# Patient Record
Sex: FEMALE | Race: WHITE | Employment: UNEMPLOYED | URBAN - METROPOLITAN AREA
[De-identification: names, ages, dates, MRNs, and addresses within clinical notes are randomized per-mention and may not be internally consistent; named-entity substitution may affect disease eponyms.]

---

## 2024-09-06 ENCOUNTER — APPOINTMENT (OUTPATIENT)
Dept: ULTRASOUND IMAGING | Facility: HOSPITAL | Age: 24
End: 2024-09-06
Attending: EMERGENCY MEDICINE
Payer: COMMERCIAL

## 2024-09-06 ENCOUNTER — HOSPITAL ENCOUNTER (INPATIENT)
Facility: HOSPITAL | Age: 24
LOS: 2 days | Discharge: HOME | End: 2024-09-08
Attending: EMERGENCY MEDICINE | Admitting: HOSPITALIST
Payer: COMMERCIAL

## 2024-09-06 ENCOUNTER — HOSPITAL ENCOUNTER (OUTPATIENT)
Age: 24
Discharge: ANOTHER HOSPITAL | End: 2024-09-06
Payer: COMMERCIAL

## 2024-09-06 VITALS
HEART RATE: 100 BPM | RESPIRATION RATE: 16 BRPM | SYSTOLIC BLOOD PRESSURE: 122 MMHG | DIASTOLIC BLOOD PRESSURE: 58 MMHG | TEMPERATURE: 101 F | OXYGEN SATURATION: 98 %

## 2024-09-06 DIAGNOSIS — R10.9 ABDOMINAL PAIN, ACUTE: Primary | ICD-10-CM

## 2024-09-06 DIAGNOSIS — R10.9 LEFT FLANK PAIN: ICD-10-CM

## 2024-09-06 DIAGNOSIS — Z34.92 SECOND TRIMESTER PREGNANCY (HCC): ICD-10-CM

## 2024-09-06 DIAGNOSIS — R50.9 FEVER, UNSPECIFIED FEVER CAUSE: ICD-10-CM

## 2024-09-06 DIAGNOSIS — O21.9 NAUSEA AND VOMITING IN PREGNANCY (HCC): ICD-10-CM

## 2024-09-06 DIAGNOSIS — N12 PYELONEPHRITIS: Primary | ICD-10-CM

## 2024-09-06 LAB
ALBUMIN SERPL-MCNC: 4.1 G/DL (ref 3.2–4.8)
ALBUMIN/GLOB SERPL: 1.5 {RATIO} (ref 1–2)
ALP LIVER SERPL-CCNC: 72 U/L
ALT SERPL-CCNC: 13 U/L
ANION GAP SERPL CALC-SCNC: 9 MMOL/L (ref 0–18)
ANTIBODY SCREEN: NEGATIVE
AST SERPL-CCNC: 16 U/L (ref ?–34)
BASOPHILS # BLD AUTO: 0.04 X10(3) UL (ref 0–0.2)
BASOPHILS NFR BLD AUTO: 0.3 %
BILIRUB SERPL-MCNC: 0.3 MG/DL (ref 0.3–1.2)
BILIRUB UR QL: NEGATIVE
BUN BLD-MCNC: <5 MG/DL (ref 9–23)
CALCIUM BLD-MCNC: 8.7 MG/DL (ref 8.7–10.4)
CHLORIDE SERPL-SCNC: 105 MMOL/L (ref 98–112)
CLARITY UR: CLEAR
CO2 SERPL-SCNC: 22 MMOL/L (ref 21–32)
COLOR UR: YELLOW
CREAT BLD-MCNC: 0.56 MG/DL
DEPRECATED RDW RBC AUTO: 41.9 FL (ref 35.1–46.3)
EGFRCR SERPLBLD CKD-EPI 2021: 131 ML/MIN/1.73M2 (ref 60–?)
EOSINOPHIL # BLD AUTO: 0.03 X10(3) UL (ref 0–0.7)
EOSINOPHIL NFR BLD AUTO: 0.2 %
ERYTHROCYTE [DISTWIDTH] IN BLOOD BY AUTOMATED COUNT: 13.2 % (ref 11–15)
GLOBULIN PLAS-MCNC: 2.8 G/DL (ref 2–3.5)
GLUCOSE BLD-MCNC: 115 MG/DL (ref 70–99)
GLUCOSE UR-MCNC: NORMAL MG/DL
HCT VFR BLD AUTO: 35 %
HGB BLD-MCNC: 12 G/DL
HGB UR QL STRIP.AUTO: NEGATIVE
IMM GRANULOCYTES # BLD AUTO: 0.05 X10(3) UL (ref 0–1)
IMM GRANULOCYTES NFR BLD: 0.4 %
KETONES UR-MCNC: 40 MG/DL
LACTATE SERPL-SCNC: 1.6 MMOL/L (ref 0.5–2)
LEUKOCYTE ESTERASE UR QL STRIP.AUTO: 75
LIPASE SERPL-CCNC: 34 U/L (ref 12–53)
LYMPHOCYTES # BLD AUTO: 1.49 X10(3) UL (ref 1–4)
LYMPHOCYTES NFR BLD AUTO: 11.6 %
MCH RBC QN AUTO: 29.8 PG (ref 26–34)
MCHC RBC AUTO-ENTMCNC: 34.3 G/DL (ref 31–37)
MCV RBC AUTO: 86.8 FL
MONOCYTES # BLD AUTO: 0.64 X10(3) UL (ref 0.1–1)
MONOCYTES NFR BLD AUTO: 5 %
NEUTROPHILS # BLD AUTO: 10.64 X10 (3) UL (ref 1.5–7.7)
NEUTROPHILS # BLD AUTO: 10.64 X10(3) UL (ref 1.5–7.7)
NEUTROPHILS NFR BLD AUTO: 82.5 %
NITRITE UR QL STRIP.AUTO: NEGATIVE
PH UR: 6.5 [PH] (ref 5–8)
PLATELET # BLD AUTO: 240 10(3)UL (ref 150–450)
POTASSIUM SERPL-SCNC: 3.7 MMOL/L (ref 3.5–5.1)
PROT SERPL-MCNC: 6.9 G/DL (ref 5.7–8.2)
PROT UR-MCNC: 20 MG/DL
RBC # BLD AUTO: 4.03 X10(6)UL
RH BLOOD TYPE: POSITIVE
RH BLOOD TYPE: POSITIVE
SARS-COV-2 RNA RESP QL NAA+PROBE: NOT DETECTED
SODIUM SERPL-SCNC: 136 MMOL/L (ref 136–145)
SP GR UR STRIP: 1.02 (ref 1–1.03)
UROBILINOGEN UR STRIP-ACNC: NORMAL
WBC # BLD AUTO: 12.9 X10(3) UL (ref 4–11)

## 2024-09-06 PROCEDURE — 76815 OB US LIMITED FETUS(S): CPT | Performed by: EMERGENCY MEDICINE

## 2024-09-06 PROCEDURE — 99215 OFFICE O/P EST HI 40 MIN: CPT | Performed by: EMERGENCY MEDICINE

## 2024-09-06 PROCEDURE — 76770 US EXAM ABDO BACK WALL COMP: CPT | Performed by: EMERGENCY MEDICINE

## 2024-09-06 PROCEDURE — 99222 1ST HOSP IP/OBS MODERATE 55: CPT | Performed by: HOSPITALIST

## 2024-09-06 RX ORDER — SODIUM CHLORIDE 9 MG/ML
INJECTION, SOLUTION INTRAVENOUS CONTINUOUS
Status: DISCONTINUED | OUTPATIENT
Start: 2024-09-06 | End: 2024-09-08

## 2024-09-06 RX ORDER — ONDANSETRON 2 MG/ML
4 INJECTION INTRAMUSCULAR; INTRAVENOUS ONCE
Status: COMPLETED | OUTPATIENT
Start: 2024-09-06 | End: 2024-09-06

## 2024-09-06 RX ORDER — ACETAMINOPHEN 500 MG
1000 TABLET ORAL ONCE
Status: COMPLETED | OUTPATIENT
Start: 2024-09-06 | End: 2024-09-06

## 2024-09-06 RX ORDER — ONDANSETRON 2 MG/ML
4 INJECTION INTRAMUSCULAR; INTRAVENOUS EVERY 6 HOURS PRN
Status: DISCONTINUED | OUTPATIENT
Start: 2024-09-06 | End: 2024-09-08

## 2024-09-06 RX ORDER — ACETAMINOPHEN 500 MG
500 TABLET ORAL EVERY 4 HOURS PRN
Status: DISCONTINUED | OUTPATIENT
Start: 2024-09-06 | End: 2024-09-07

## 2024-09-06 NOTE — ED PROVIDER NOTES
Patient Seen in: [facility] Emergency Department    History   No chief complaint on file.    Stated Complaint: Abdominal Pain    HPI    This is an otherwise healthy 24-year-old -0-0-1 approximately 16 weeks gestation, has not established with an OB in the [place] yet but had an ultrasound done in [place] at the end of 2024, presents to the ER for evaluation of pelvic pain, left flank pain, headache, subjective fever, chills, feeling unwell.  Bermudian interpretation utilized throughout encounter and patient's  also contributes to history.  She states over the past couple of days she has developed multiple symptoms including dysuria and burning when she urinates, slight suprapubic pressure, and now today is having left flank pain.  She reports chills last night though did not take a temperature at home she thinks she could have had a fever.  She reports nausea and poor p.o. intake without vomiting, diarrhea or bloody stool.  Denies hematuria.  No vaginal bleeding or discharge.  She also reports a generalized headache since yesterday in the front.  Denies numbness weakness or tingling in her extremities or vision changes.  She feels aching all over her body.  She does report mild sore throat but no cough or congestion.  No chest pain or difficulty breathing.  She was seen at urgent care yesterday and started on cephalexin but her symptoms haven't improved. She was sent to the ER today from  due to her symptoms for further evaluation.     History reviewed. No pertinent past medical history.    History reviewed. No pertinent surgical history.         History reviewed. No pertinent family history.    Social History     Socioeconomic History    Marital status:    Tobacco Use    Smoking status: Never    Smokeless tobacco: Never   Vaping Use    Vaping status: Never Used   Substance and Sexual Activity    Alcohol use: Not Currently    Drug use: Never     Social Determinants of Health      Food Insecurity: No Food Insecurity (9/6/2024)    Food Insecurity     Food Insecurity: Never true   Transportation Needs: No Transportation Needs (9/6/2024)    Transportation Needs     Lack of Transportation: No   Housing Stability: Low Risk  (9/6/2024)    Housing Stability     Housing Instability: No       Review of Systems    Positive for stated complaint: Abdominal Pain  Other systems are as noted in HPI.  Constitutional and vital signs reviewed.      All other systems reviewed and negative except as noted above.    PSFH elements reviewed from today and agreed except as otherwise stated in HPI.    Physical Exam     ED Triage Vitals   BP 09/06/24 1659 156/87   Pulse 09/06/24 1659 108   Resp 09/06/24 1659 18   Temp 09/06/24 1659 99.4 °F (37.4 °C)   Temp src 09/06/24 1659 Temporal   SpO2 09/06/24 1659 100 %   O2 Device 09/06/24 2244 None (Room air)       Current:/58 (BP Location: Right arm)   Pulse 83   Temp 98.3 °F (36.8 °C) (Oral)   Resp 18   Ht 170.2 cm (5' 7\")   Wt 71.7 kg   SpO2 97%   BMI 24.75 kg/m²   GENERAL: alert, no distress  SKIN: good skin turgor, no rashes  HEENT: atraumatic, normocephalic, no pharyngeal swelling or tonsillar enlargement   EYES: sclera non icteric, conjunctiva non-injected, PERRLA and EOMI bilaterally   NECK: supple, no meningismus, FROM   LUNGS:no resp distress, lungs CTAB no wheezes or crackles.   CARDIO: tachycardic with regular rhythm, no murmur, 2+ radial and dp pulses bilaterally   EXTREMITIES: no cyanosis, clubbing or edema  BACK: no midline c/t/l spine ttp. No stepoff or deformity. +L CVA tenderness, no R CVAT.   GI: soft, mild suprapubic tenderness, no guarding or rebound. Abdomen gravid below umbilicus.   NEURO: Strength is 5/5 bilateral upper extremities on handgrip, elbow flexion and extension, hip flexion, knee flexion and extension and ankle plantarflexion and dorsiflexion, sensation tact light touch and symmetric bilateral upper and lower  extremities      ED Course     Labs Reviewed   URINALYSIS WITH CULTURE REFLEX - Abnormal; Notable for the following components:       Result Value    Ketones Urine 40 (*)     Protein Urine 20 (*)     Leukocyte Esterase Urine 75 (*)     WBC Urine 6-10 (*)     Bacteria Urine Rare (*)     Squamous Epi. Cells Few (*)     All other components within normal limits   CBC WITH DIFFERENTIAL WITH PLATELET - Abnormal; Notable for the following components:    WBC 12.9 (*)     Neutrophil Absolute Prelim 10.64 (*)     Neutrophil Absolute 10.64 (*)     All other components within normal limits   COMP METABOLIC PANEL (14) - Abnormal; Notable for the following components:    Glucose 115 (*)     BUN <5 (*)     All other components within normal limits   LIPASE - Normal   LACTIC ACID, PLASMA - Normal   RAPID SARS-COV-2 BY PCR - Normal   BASIC METABOLIC PANEL (8)   CBC WITH DIFFERENTIAL WITH PLATELET   TYPE AND SCREEN    Narrative:     The following orders were created for panel order Type and screen.  Procedure                               Abnormality         Status                     ---------                               -----------         ------                     ABORH (Blood Type)[[id number]]                               Final result               Antibody Screen[[id number]]                                  Final result                 Please view results for these tests on the individual orders.   ABORH (BLOOD TYPE)   ANTIBODY SCREEN   ABORH CONFIRMATION   BLOOD CULTURE   BLOOD CULTURE   URINE CULTURE, ROUTINE       MDM      24F  at approx 16 wga presents to the ED with pelvic pain, dysuria, subjective fever, chills, left flank pain, headache, has already been on cephalexin for a couple of days. Pt mildly tachycardic though afebrile on arrival to the ED. Initial BP from triage is hypertensive. On exam has mild L CVAT and LLQ ttp. Abdomen gravid but otherwise no other tenderness throughout.     Ddx    Pyelonephritis  Uti  Clinically low suspicion for diverticulitis  Low suspicion TOA   Low suspicion pregnancy loss   Sepsis   I have low suspicion her headache is secondary to venous sinus thrombosis, clinically   Given she's <20 wga preeclampsia is unlikely, though pt did have elevated BP in triage     Plan: cbc, cmp, bcx, lactic, ua/ucx, kidney US, pelvic US    Given pt pregnant with concern for pyelonephritis anticipate likely admission for observation and IV antibiotics   Admission disposition: 9/6/2024  7:17 PM         ED Course as of 09/07/24 0018  ------------------------------------------------------------  Time: 09/06 1739  Comment:  bpm   ------------------------------------------------------------  Time: 09/06 1746  Value: Urinalysis with Culture Reflex(!)  Comment: UA with ketonuria, c/w UTI. Rocephin ordered   ------------------------------------------------------------  Time: 09/06 1746  Value: WBC(!): 12.9  Comment: Leukocytosis with left shift noted.   ------------------------------------------------------------  Time: 09/06 1749  Value: Rapid SARS-CoV-2 by PCR: Not Detected  Comment: (Reviewed)  ------------------------------------------------------------  Time: 09/06 1749  Value: Glucose Urine: Normal  Comment: (Reviewed)  ------------------------------------------------------------  Time: 09/06 1749  Value: Protein Urine(!): 20  Comment: (Reviewed)  ------------------------------------------------------------  Time: 09/06 1814  Value: Lipase: 34  Comment: Lipase normal. Pt lactic acid is normal.   ------------------------------------------------------------  Time: 09/06 1814  Value: CREATININE: 0.56  Comment: Cr normal   ------------------------------------------------------------  Time: 09/06 2110  Value: US PREGNANCY LTD (CPT=76815)  Comment:   Impression  CONCLUSION:  1. Single live intrauterine gestation in cephalic presentation.  Average ultrasound age based on today's exam 19 weeks  4 days +/-1 week and 3 days corresponding to LILLY of January 27, 2025.  2. Incidental 3 mm left choroid plexus cyst.  Follow-up nonemergent complete fetal anatomical survey recommended.      ------------------------------------------------------------  Time: 09/06 2110  Value: US KIDNEY/BLADDER (CPT=76770)  Comment: FINDINGS:   RIGHT KIDNEY: Normal.  Right kidney length is 12.2 cm.  Normal echotexture.  No hydronephrosis, mass, calculus or perirenal fluid.    LEFT KIDNEY: Normal.  Left kidney length is 10.8 cm.  Normal echotexture.  No hydronephrosis, mass, calculus or perirenal fluid.    BLADDER: The bladder is collapsed measuring 12.4 mL and is not well assessed.  Grossly no stones or focal urothelial thickening.  The ureteral jets are patent bilaterally.  The gravid uterus is partially imaged.      CONCLUSION:   1. Unremarkable sonographic appearance of the kidneys.   2. Bladder is only mildly distended but without gross abnormality.     ------------------------------------------------------------  Time: 09/06 2134  Comment: Pt reevaluated sts feeling much better. VSS. Updated with labs, results of US and plan.          Disposition and Plan     Clinical Impression:  1. Pyelonephritis    2. Second trimester pregnancy (HCC)    3. Left flank pain         Disposition:  Admit  9/6/2024  7:17 pm       Maria Ines Patel DO  Attending Physician  Emergency Medicine

## 2024-09-06 NOTE — ED INITIAL ASSESSMENT (HPI)
Patient complains of fever, abd pain, and nausea with head pain since yesterday, states she is 16 weeks pregnant

## 2024-09-06 NOTE — DISCHARGE INSTRUCTIONS
Acude al servicio de urgencias debido a dolor y sensibilidad en la parte inferior del abdomen, así trish dolor y sensibilidad en el cuadrante superior jovany.  También presenta fiebre.  Nada de comer ni beber de marium a la kerrie de emergencias.    You are going to the emergency department due to lower abdominal pain and tenderness as well as left upper quadrant abdominal pain and tenderness.  You are also exhibiting a fever.  Nothing to eat or drink on your way to the emergency room.

## 2024-09-06 NOTE — ED INITIAL ASSESSMENT (HPI)
Pt is 16 wks pregnant with bilateral lower abdominal pain wrapping to flank with HA since yesterday; seen at [facility] yesterday and given Keflex; last US 7/31/24; denies vaginal bleeding or discharge; pt is Faroese speaking

## 2024-09-06 NOTE — ED PROVIDER NOTES
Patient Seen in: [facility]      History     Chief Complaint   Patient presents with    Abdominal Pain     Stated Complaint: 4 MONTHS PREG. /  STOMACH PAIN    Subjective:   HPI  Lena Montenegro is a 24 year old 16-week pregnant female who is Wolof-speaking accompanied by  who prefers to translate for patient here for lower abdominal cramping, and pain. Unk gestation, and DD.  No Vaginal complaints.   Pain radiates to the back, and left upper quadrant.  Nausea and vomiting 4 times today. Has had prenatal care in [place] including . No established care In the states.  Was seen at an outside facility clinic yesterday and was diagnosed with urinary tract infection.  She was placed on Keflex.  Symptoms are now worse.  Reports fetal movement.     Objective:   History reviewed. No pertinent past medical history.           History reviewed. No pertinent surgical history.             Social History     Socioeconomic History    Marital status:    Tobacco Use    Smoking status: Never    Smokeless tobacco: Never              Review of Systems    Positive for stated Chief Complaint: Abdominal Pain    Other systems are as noted in HPI.  Constitutional and vital signs reviewed.      All other systems reviewed and negative except as noted above.    Physical Exam     ED Triage Vitals   BP 09/06/24 1628 122/58   Pulse 09/06/24 1617 100   Resp 09/06/24 1617 16   Temp 09/06/24 1617 (!) 100.5 °F (38.1 °C)   Temp src 09/06/24 1617 Oral   SpO2 09/06/24 1617 98 %   O2 Device 09/06/24 1617 None (Room air)       Current Vitals:   Vital Signs  BP: 122/58  Pulse: 100  Resp: 16  Temp: (!) 100.5 °F (38.1 °C)  Temp src: Oral  Fetal Heart Tones: 160    Oxygen Therapy  SpO2: 98 %  O2 Device: None (Room air)            Physical Exam  Vitals and nursing note reviewed.   Constitutional:       Appearance: Normal appearance. She is well-developed.   HENT:      Head: Normocephalic.      Nose: Nose normal.   Eyes:       Pupils: Pupils are equal, round, and reactive to light.   Cardiovascular:      Rate and Rhythm: Normal rate.      Pulses: Normal pulses.   Pulmonary:      Effort: Pulmonary effort is normal.   Abdominal:      Tenderness: There is abdominal tenderness in the right upper quadrant, right lower quadrant, suprapubic area and left upper quadrant.   Musculoskeletal:         General: Normal range of motion.   Skin:     Capillary Refill: Capillary refill takes less than 2 seconds.   Neurological:      General: No focal deficit present.      Mental Status: She is alert and oriented to person, place, and time.   Psychiatric:         Mood and Affect: Mood normal.         Behavior: Behavior normal.         Thought Content: Thought content normal.         Judgment: Judgment normal.               ED Course   Labs Reviewed - No data to display                   MDM                                      Medical Decision Making  [facility] [place] [facility] for higher level of care due to pregnancy, abdominal pain, and fever.  Patient and significant other seem to have a good relationship.  I do not suspect trafficking at this time.   They are aware that she needs to stay n.p.o. until otherwise specified.  They are of sound mind with decision-making capabilities.  No suspicion of drugs, altered mental status, or alcohol.  Stable for self transfer.      Differential diagnosis includes but not limited to pyelonephritis, urosepsis, preeclampsia, kidney stones, HELLP, COVID.    Problems Addressed:  Abdominal pain, acute: acute illness or injury  Fever, unspecified fever cause: acute illness or injury  Nausea and vomiting in pregnancy (HCC): acute illness or injury        Disposition and Plan     Clinical Impression:  1. Abdominal pain, acute    2. Nausea and vomiting in pregnancy (HCC)    3. Fever, unspecified fever cause         Disposition:  Ic to ed  9/6/2024  4:17 pm    Follow-up:  No follow-up provider  specified.        Medications Prescribed:  There are no discharge medications for this patient.

## 2024-09-06 NOTE — ED QUICK NOTES
Orders for admission, patient is aware of plan and ready to go upstairs. Any questions, please call ED RN Anthony SORIA at extension [phone].     Patient Covid vaccination status: Unvaccinated     COVID Test Ordered in ED: Rapid SARS-CoV-2 by PCR    COVID Suspicion at Admission: N/A    Running Infusions:      Mental Status/LOC at time of transport: A+Ox4    Other pertinent information:   CIWA score: N/A   NIH score:  N/A

## 2024-09-07 PROBLEM — O23.02 PYELONEPHRITIS AFFECTING PREGNANCY IN SECOND TRIMESTER (HCC): Status: ACTIVE | Noted: 2024-09-06

## 2024-09-07 LAB
ANION GAP SERPL CALC-SCNC: 6 MMOL/L (ref 0–18)
BASOPHILS # BLD AUTO: 0.03 X10(3) UL (ref 0–0.2)
BASOPHILS NFR BLD AUTO: 0.3 %
BUN BLD-MCNC: <5 MG/DL (ref 9–23)
CALCIUM BLD-MCNC: 8.7 MG/DL (ref 8.7–10.4)
CHLORIDE SERPL-SCNC: 109 MMOL/L (ref 98–112)
CO2 SERPL-SCNC: 25 MMOL/L (ref 21–32)
CREAT BLD-MCNC: 0.51 MG/DL
DEPRECATED RDW RBC AUTO: 44.3 FL (ref 35.1–46.3)
EGFRCR SERPLBLD CKD-EPI 2021: 134 ML/MIN/1.73M2 (ref 60–?)
EOSINOPHIL # BLD AUTO: 0.1 X10(3) UL (ref 0–0.7)
EOSINOPHIL NFR BLD AUTO: 1.1 %
ERYTHROCYTE [DISTWIDTH] IN BLOOD BY AUTOMATED COUNT: 13.4 % (ref 11–15)
GLUCOSE BLD-MCNC: 85 MG/DL (ref 70–99)
HCT VFR BLD AUTO: 32.4 %
HGB BLD-MCNC: 10.9 G/DL
IMM GRANULOCYTES # BLD AUTO: 0.04 X10(3) UL (ref 0–1)
IMM GRANULOCYTES NFR BLD: 0.5 %
LYMPHOCYTES # BLD AUTO: 1.79 X10(3) UL (ref 1–4)
LYMPHOCYTES NFR BLD AUTO: 20.4 %
MCH RBC QN AUTO: 30.2 PG (ref 26–34)
MCHC RBC AUTO-ENTMCNC: 33.6 G/DL (ref 31–37)
MCV RBC AUTO: 89.8 FL
MONOCYTES # BLD AUTO: 0.65 X10(3) UL (ref 0.1–1)
MONOCYTES NFR BLD AUTO: 7.4 %
NEUTROPHILS # BLD AUTO: 6.17 X10 (3) UL (ref 1.5–7.7)
NEUTROPHILS # BLD AUTO: 6.17 X10(3) UL (ref 1.5–7.7)
NEUTROPHILS NFR BLD AUTO: 70.3 %
PLATELET # BLD AUTO: 208 10(3)UL (ref 150–450)
POTASSIUM SERPL-SCNC: 3.9 MMOL/L (ref 3.5–5.1)
RBC # BLD AUTO: 3.61 X10(6)UL
SODIUM SERPL-SCNC: 140 MMOL/L (ref 136–145)
WBC # BLD AUTO: 8.8 X10(3) UL (ref 4–11)

## 2024-09-07 PROCEDURE — 99233 SBSQ HOSP IP/OBS HIGH 50: CPT | Performed by: INTERNAL MEDICINE

## 2024-09-07 PROCEDURE — 99221 1ST HOSP IP/OBS SF/LOW 40: CPT | Performed by: OBSTETRICS & GYNECOLOGY

## 2024-09-07 RX ORDER — ACETAMINOPHEN 500 MG
500 TABLET ORAL EVERY 4 HOURS PRN
Status: DISCONTINUED | OUTPATIENT
Start: 2024-09-07 | End: 2024-09-08

## 2024-09-07 RX ORDER — CHOLECALCIFEROL (VITAMIN D3) 25 MCG
1 TABLET,CHEWABLE ORAL DAILY
Status: DISCONTINUED | OUTPATIENT
Start: 2024-09-07 | End: 2024-09-08

## 2024-09-07 NOTE — CONSULTS
[facility]  Obstetrics and Gynecology Consultation     Lena Montenegro Patient Status:  Inpatient    2000 MRN [id number]   Location [facility] 5SW/SE Attending Dacia Molina MD   Hospital Day 1 PCP None Pcp     Reason for Consultation:  pregnancy, pyelonephritis.      Subjective:     HPI: Lena Montenegro is a 24 year old  female who was admitted on 2024 with c/o abdominal pain, fevers. Was seen in  and referred to ED for treatment given pregnancy. Admitted for pyelonephritis with ceftiaxone IV.   The patient reports feeling improved. Still with some intermittent mild flank pain. No fevers. Patient denies vaginal discharge, bleeding, abdominal cramping, LOF. Positive fetal movement.   Has not started prenatal care yet. Was in [place] about 3 weeks ago and had ultrasound there, no labs.    OB History:  OB History    Para Term  AB Living   2 1 0 0 0 0   SAB IAB Ectopic Multiple Live Births   0 0 0 0 0       Gyne History:     No LMP recorded. Patient is pregnant.    Narrative   PROCEDURE: US PREGNANCY LTD (CPT=76815)     COMPARISON: None.     INDICATIONS: LLQ pain 16 wga pregnant hasn't seen OBGYN in [place] yet     TECHNIQUE: Limited sonographic examination for obstetrical and fetal evaluation.     FINDINGS:  FETAL NUMBER: Single.  POSITION cephalic  FETAL HEART RATE: 153-157 BPM    AMNIOTIC FLUID VOLUME: Grossly normal.  PLACENTA LOCATION: Posterior.  No previa.     MENSTRUAL AGE/LILLY: 19 weeks 6 days/2025     SIZE DATE     BIPARIETAL DIAMETER: 4.47 cm 19 weeks 4 days  HEAD CIRCUMFERENCE: 16.65 cm 19 weeks 2 days  ABD CIRCUMFERENCE: 14.36 cm 19 weeks 5 days  FEMUR LENGTH: 3.08 cm 19 weeks 4 days  ESTIMATED FETAL WEIGHT: 301.83 gplus or minus 45.27 g     ABNORMALITIES/OTHER: Incidental 3 mm left choroid plexus cyst.  Maternal ovaries were not visualized.  No adnexal mass or fluid collection.     ULTRASOUND AGE/LILLY: 19 weeks 4 days ñ 1 week  3 days LILLY January 27, 2025               Impression   CONCLUSION:  1. Single live intrauterine gestation in cephalic presentation.  Average ultrasound age based on today's exam 19 weeks 4 days +/-1 week and 3 days corresponding to LILLY of January 27, 2025.  2. Incidental 3 mm left choroid plexus cyst.  Follow-up nonemergent complete fetal anatomical survey recommended.                Dictated by (CST): Segundo Herrera MD on 9/06/2024 at 7:27 PM      Finalized by (CST): Segundo Herrera MD on 9/06/2024 at 7:33 PM           Meds:    acetaminophen    cefTRIAXone    sodium chloride    ondansetron    All:  No Known Allergies    PMH:  History reviewed. No pertinent past medical history.    PSH:  History reviewed. No pertinent surgical history.    SH:  Social History     Socioeconomic History    Marital status:      Spouse name: Not on file    Number of children: Not on file    Years of education: Not on file    Highest education level: Not on file   Occupational History    Not on file   Tobacco Use    Smoking status: Never    Smokeless tobacco: Never   Vaping Use    Vaping status: Never Used   Substance and Sexual Activity    Alcohol use: Not Currently    Drug use: Never    Sexual activity: Not on file   Other Topics Concern    Not on file   Social History Narrative    Not on file     Social Determinants of Health     Financial Resource Strain: Not on file   Food Insecurity: No Food Insecurity (9/6/2024)    Food Insecurity     Food Insecurity: Never true   Transportation Needs: No Transportation Needs (9/6/2024)    Transportation Needs     Lack of Transportation: No     Car Seat: Not on file   Stress: Not on file   Housing Stability: Low Risk  (9/6/2024)    Housing Stability     Housing Instability: No     Housing Instability Emergency: Not on file     Crib or Bassinette: Not on file       FH:  History reviewed. No pertinent family history.    Review of Systems:  General: no complaints  Eyes: no  complaints  Respiratory: no complaints  Cardiovascular: no complaints  GI: denies abdominal pain, diarrhea, constipationSee HPI  : no complaints,  See HPI  Hematological/lymphatic: no complaints  Breast: denies rashes, skin changes, pain, lumps or discharge   Psychiatric: no complaints  Endocrine:no complaints  Neurological: no complaints  Immunological: no complaints  Musculoskeletal:no complaints      Objective:     Vitals:    24 1011   BP: 93/59   Pulse: 80   Resp: 18   Temp: 97.8 °F (36.6 °C)        GENERAL: well developed, well nourished, in no apparent distress, alert and orientated X 3  PSYCH: mood and affect stable   SKIN: no rashes, no lesions  HEENT: normal  LUNGS: respiration unlabored  CARDIOVASCULAR: no peripheral edema or varicosities, skin warm and dry     ABDOMEN: Soft,; non tender, gravid  Back - minimal CVAT Left side    GYNE/: deferred    EXTREMITIES:  Normal range of motion, strength 5/5, nontender without edema    Labs:  Lab Results   Component Value Date    WBC 8.8 2024    HGB 10.9 2024    HCT 32.4 2024    .0 2024    CREATSERUM 0.51 2024    BUN <5 2024     2024    K 3.9 2024     2024    CO2 25.0 2024    GLU 85 2024    CA 8.7 2024    ALB 4.1 2024    ALKPHO 72 2024    BILT 0.3 2024    TP 6.9 2024    AST 16 2024    ALT 13 2024    LIP 34 2024          Assessment:      Lena Montenegro is a 24 year old  @19/5wks by ultrasound, admitted on 2024 for treatment pyelonephritis in pregnancy . Request for OBGYN consultation was made.   Wbc normal.  Symptoms improving.     Patient Active Problem List   Diagnosis    Abdominal pain, acute    Pyelonephritis    Second trimester pregnancy (HCC)    Left flank pain         Plan:     Continue Ceftriaxone. Can likely go home tomorrow from OB standpoint if patient has continued improvement and remains afebrile >48  hours. Recommend 10 days BactrimDS (ok in second trimester). Will then need prophylaxis for rest of pregnancy based on urine culture.   Follow up for routine prenatal care. Will provide office information.   Thank you for the consult.     All of the findings and plan were discussed with the patient.  She notes understanding and agrees with the plan of care.  All questions were answered to the best of my ability at this time.    Time spent on counseling/coordination of care:  25  Total time spent with patient:  15 Minutes  MD JORDY Christensen

## 2024-09-07 NOTE — H&P
[facility]    PATIENT'S NAME: SALVATORE STUBBS   ATTENDING PHYSICIAN: Maria Ines Patel DO   PATIENT ACCOUNT#:   [id number]    LOCATION:  [address]  MEDICAL RECORD #:   [id number]       YOB: 2000  ADMISSION DATE:       2024    HISTORY AND PHYSICAL EXAMINATION    CHIEF COMPLAINT:  Urinary tract infection, possible pyelonephritis.    HISTORY OF PRESENT ILLNESS:  The patient is a 24-year-old  female who came into the emergency department for evaluation of night sweats, body aches, and fever.  Urinalysis showed evidence of possible urinary tract infection.  She had been on Keflex as an outpatient recently without significant improvement in her symptoms.  Today in the emergency room, she had transvaginal ultrasound which showed single live intrauterine gestation in cephalic presentation 19 weeks 4 days estimated gestational age.  Kidney ultrasound showed was negative.  Bladder is only mildly distended.  Patient was given IV Rocephin, and she will be admitted to the hospital for further management.    PAST MEDICAL HISTORY:   1, para 2, currently 19-week pregnant.  No other medical problems.    PAST SURGICAL HISTORY:  None.    ALLERGIES:  No known drug allergies.    SOCIAL HISTORY:  No tobacco, alcohol, or drug use.    REVIEW OF SYSTEMS:  Body aches, lower back pain, fever, chills, and night sweats for last 3 days but no dysuria.  Other 12-point review of systems is negative.      PHYSICAL EXAMINATION:    GENERAL:  Alert and oriented to time, place, and person.  No acute distress.  VITAL SIGNS:  Temperature 100.5, pulse 108, respiratory rate 18, blood pressure 100/55, pulse ox 100% on room air.   HEENT:  Atraumatic.  Oropharynx clear.  Dry mucous membranes.  NECK:  Supple.  No lymphadenopathy.  Trachea midline.  Full range of motion.  LUNGS:  Clear to auscultation bilaterally.  Normal respiratory effort.   HEART:  Regular rate, rhythm.  S1 and S2 auscultated.  No  murmur.  ABDOMEN:  Soft, nondistended.  No tenderness.  Positive bowel sounds.   EXTREMITIES:  No peripheral edema, clubbing, or cyanosis.  NEUROLOGIC:  Motor and sensory intact.    ASSESSMENT:    1.   Urinary tract infection, possible pyelonephritis.  2.   Gestational age 19 weeks, live pregnancy.    PLAN:  Patient will be admitted to general medical floor.  IV Rocephin.  IV fluids.  OB/GYN consult.  Further recommendations to follow.    Dictated By Rasta Villa MD  d: 09/06/2024 19:45:27  t: 09/06/2024 21:31:17  [facility] [id number]  FB/    cc: Maria Ines Patel DO

## 2024-09-07 NOTE — PLAN OF CARE
Problem: Patient Centered Care  Goal: Patient preferences are identified and integrated in the patient's plan of care  Description: Interventions:  - What would you like us to know as we care for you? Currently 19 weeks pregnant  - Provide timely, complete, and accurate information to patient/family  - Incorporate patient and family knowledge, values, beliefs, and cultural backgrounds into the planning and delivery of care  - Encourage patient/family to participate in care and decision-making at the level they choose  - Honor patient and family perspectives and choices  Outcome: Progressing     Problem: Patient/Family Goals  Goal: Patient/Family Long Term Goal  Description: Patient's Long Term Goal: discharge from hospital    Interventions:  - monitor vital signs   - monitor appropriate labs  - pain management   - administer medications per order  - follow MD orders  - diagnostics per order  - update and inform patient and family on plan of care  - discharge planning  - See additional Care Plan goals for specific interventions  Outcome: Progressing  Goal: Patient/Family Short Term Goal  Description: Patient's Short Term Goal: improve urinary tract infection    Interventions:   - monitor vital signs   - monitor appropriate labs  - pain management   - administer medications per order  - follow MD orders  - diagnostics per order  - update and inform patient and family on plan of care  - See additional Care Plan goals for specific interventions  Outcome: Progressing     Problem: PAIN - ADULT  Goal: Verbalizes/displays adequate comfort level or patient's stated pain goal  Description: INTERVENTIONS:  - Encourage pt to monitor pain and request assistance  - Assess pain using appropriate pain scale  - Administer analgesics based on type and severity of pain and evaluate response  - Implement non-pharmacological measures as appropriate and evaluate response  - Consider cultural and social influences on pain and pain  management  - Manage/alleviate anxiety  - Utilize distraction and/or relaxation techniques  - Monitor for opioid side effects  - Notify MD/LIP if interventions unsuccessful or patient reports new pain  - Anticipate increased pain with activity and pre-medicate as appropriate  Outcome: Progressing     Problem: SAFETY ADULT - FALL  Goal: Free from fall injury  Description: INTERVENTIONS:  - Assess pt frequently for physical needs  - Identify cognitive and physical deficits and behaviors that affect risk of falls.  - [place] fall precautions as indicated by assessment.  - Educate pt/family on patient safety including physical limitations  - Instruct pt to call for assistance with activity based on assessment  - Modify environment to reduce risk of injury  - Provide assistive devices as appropriate  - Consider OT/PT consult to assist with strengthening/mobility  - Encourage toileting schedule  Outcome: Progressing     Problem: DISCHARGE PLANNING  Goal: Discharge to home or other facility with appropriate resources  Description: INTERVENTIONS:  - Identify barriers to discharge w/pt and caregiver  - Include patient/family/discharge partner in discharge planning  - Arrange for needed discharge resources and transportation as appropriate  - Identify discharge learning needs (meds, wound care, etc)  - Arrange for interpreters to assist at discharge as needed  - Consider post-discharge preferences of patient/family/discharge partner  - Complete POLST form as appropriate  - Assess patient's ability to be responsible for managing their own health  - Refer to Case Management Department for coordinating discharge planning if the patient needs post-hospital services based on physician/LIP order or complex needs related to functional status, cognitive ability or social support system  Outcome: Progressing     Problem: Altered Communication/Language Barrier  Goal: Patient/Family is able to understand and participate in their  care  Description: Interventions:  - Assess communication ability and preferred communication style  - Implement communication aides and strategies  - Use visual cues when possible  - Listen attentively, be patient, do not interrupt  - Minimize distractions  - Allow time for understanding and response  - Establish method for patient to ask for assistance (call light)  - Provide an  as needed  - Communicate barriers and strategies to overcome with those who interact with patient  Outcome: Progressing     Problem: GASTROINTESTINAL - ADULT  Goal: Minimal or absence of nausea and vomiting  Description: INTERVENTIONS:  - Maintain adequate hydration with IV or PO as ordered and tolerated  - Nasogastric tube to low intermittent suction as ordered  - Evaluate effectiveness of ordered antiemetic medications  - Provide nonpharmacologic comfort measures as appropriate  - Advance diet as tolerated, if ordered  - Obtain nutritional consult as needed  - Evaluate fluid balance  Outcome: Progressing  Goal: Maintains adequate nutritional intake (undernourished)  Description: INTERVENTIONS:  - Monitor percentage of each meal consumed  - Identify factors contributing to decreased intake, treat as appropriate  - Assist with meals as needed  - Monitor I&O, WT and lab values  - Obtain nutritional consult as needed  - Optimize oral hygiene and moisture  - Encourage food from home; allow for food preferences  - Enhance eating environment  Outcome: Progressing     Problem: GENITOURINARY - ADULT  Goal: Absence of urinary retention  Description: INTERVENTIONS:  - Assess patient’s ability to void and empty bladder  - Monitor intake/output and perform bladder scan as needed  - Follow urinary retention protocol/standard of care  - Consider collaborating with pharmacy to review patient's medication profile  - Implement strategies to promote bladder emptying  Outcome: Progressing     Problem: SKIN/TISSUE INTEGRITY - ADULT  Goal: Skin  integrity remains intact  Description: INTERVENTIONS  - Assess and document risk factors for pressure ulcer development  - Assess and document skin integrity  - Monitor for areas of redness and/or skin breakdown  - Initiate interventions, skin care algorithm/standards of care as needed  Outcome: Progressing    Safety and fall precautions maintained. Bed locked in lowest position, I-bed awareness on, call light within reach. Frequent rounding by nursing staff.

## 2024-09-07 NOTE — PLAN OF CARE
Problem: Patient Centered Care  Goal: Patient preferences are identified and integrated in the patient's plan of care  Description: Interventions:  - What would you like us to know as we care for you? Currently 19 weeks pregnant  - Provide timely, complete, and accurate information to patient/family  - Incorporate patient and family knowledge, values, beliefs, and cultural backgrounds into the planning and delivery of care  - Encourage patient/family to participate in care and decision-making at the level they choose  - Honor patient and family perspectives and choices  Outcome: Progressing     Problem: Patient/Family Goals  Goal: Patient/Family Long Term Goal  Description: Patient's Long Term Goal: discharge from hospital    Interventions:  - monitor vital signs   - monitor appropriate labs  - pain management   - administer medications per order  - follow MD orders  - diagnostics per order  - update and inform patient and family on plan of care  - discharge planning  - See additional Care Plan goals for specific interventions  Outcome: Progressing  Goal: Patient/Family Short Term Goal  Description: Patient's Short Term Goal: improve urinary tract infection    Interventions:   - monitor vital signs   - monitor appropriate labs  - pain management   - administer medications per order  - follow MD orders  - diagnostics per order  - update and inform patient and family on plan of care  - See additional Care Plan goals for specific interventions  Outcome: Progressing     Problem: PAIN - ADULT  Goal: Verbalizes/displays adequate comfort level or patient's stated pain goal  Description: INTERVENTIONS:  - Encourage pt to monitor pain and request assistance  - Assess pain using appropriate pain scale  - Administer analgesics based on type and severity of pain and evaluate response  - Implement non-pharmacological measures as appropriate and evaluate response  - Consider cultural and social influences on pain and pain  management  - Manage/alleviate anxiety  - Utilize distraction and/or relaxation techniques  - Monitor for opioid side effects  - Notify MD/LIP if interventions unsuccessful or patient reports new pain  - Anticipate increased pain with activity and pre-medicate as appropriate  Outcome: Progressing     Problem: SAFETY ADULT - FALL  Goal: Free from fall injury  Description: INTERVENTIONS:  - Assess pt frequently for physical needs  - Identify cognitive and physical deficits and behaviors that affect risk of falls.  - [place] fall precautions as indicated by assessment.  - Educate pt/family on patient safety including physical limitations  - Instruct pt to call for assistance with activity based on assessment  - Modify environment to reduce risk of injury  - Provide assistive devices as appropriate  - Consider OT/PT consult to assist with strengthening/mobility  - Encourage toileting schedule  Outcome: Progressing     Problem: DISCHARGE PLANNING  Goal: Discharge to home or other facility with appropriate resources  Description: INTERVENTIONS:  - Identify barriers to discharge w/pt and caregiver  - Include patient/family/discharge partner in discharge planning  - Arrange for needed discharge resources and transportation as appropriate  - Identify discharge learning needs (meds, wound care, etc)  - Arrange for interpreters to assist at discharge as needed  - Consider post-discharge preferences of patient/family/discharge partner  - Complete POLST form as appropriate  - Assess patient's ability to be responsible for managing their own health  - Refer to Case Management Department for coordinating discharge planning if the patient needs post-hospital services based on physician/LIP order or complex needs related to functional status, cognitive ability or social support system  Outcome: Progressing     Problem: Altered Communication/Language Barrier  Goal: Patient/Family is able to understand and participate in their  care  Description: Interventions:  - Assess communication ability and preferred communication style  - Implement communication aides and strategies  - Use visual cues when possible  - Listen attentively, be patient, do not interrupt  - Minimize distractions  - Allow time for understanding and response  - Establish method for patient to ask for assistance (call light)  - Provide an  as needed  - Communicate barriers and strategies to overcome with those who interact with patient  Outcome: Progressing     Problem: GASTROINTESTINAL - ADULT  Goal: Minimal or absence of nausea and vomiting  Description: INTERVENTIONS:  - Maintain adequate hydration with IV or PO as ordered and tolerated  - Nasogastric tube to low intermittent suction as ordered  - Evaluate effectiveness of ordered antiemetic medications  - Provide nonpharmacologic comfort measures as appropriate  - Advance diet as tolerated, if ordered  - Obtain nutritional consult as needed  - Evaluate fluid balance  Outcome: Progressing  Goal: Maintains adequate nutritional intake (undernourished)  Description: INTERVENTIONS:  - Monitor percentage of each meal consumed  - Identify factors contributing to decreased intake, treat as appropriate  - Assist with meals as needed  - Monitor I&O, WT and lab values  - Obtain nutritional consult as needed  - Optimize oral hygiene and moisture  - Encourage food from home; allow for food preferences  - Enhance eating environment  Outcome: Progressing     Problem: GENITOURINARY - ADULT  Goal: Absence of urinary retention  Description: INTERVENTIONS:  - Assess patient’s ability to void and empty bladder  - Monitor intake/output and perform bladder scan as needed  - Follow urinary retention protocol/standard of care  - Consider collaborating with pharmacy to review patient's medication profile  - Implement strategies to promote bladder emptying  Outcome: Progressing     Problem: SKIN/TISSUE INTEGRITY - ADULT  Goal: Skin  integrity remains intact  Description: INTERVENTIONS  - Assess and document risk factors for pressure ulcer development  - Assess and document skin integrity  - Monitor for areas of redness and/or skin breakdown  - Initiate interventions, skin care algorithm/standards of care as needed  Outcome: Progressing

## 2024-09-07 NOTE — PROGRESS NOTES
[facility]     Hospitalist Progress Note     Lena Montenegro Patient Status:  Inpatient    2000 MRN [id number]   Location [facility] 5SW/SE Attending Dacia Molina MD   Hosp Day # 1 PCP None Pcp     Subjective:     Patient resting comfortably in bed and in NAD. Denied any active complaints at the time of interview. Reports feeling better than on admission.   All questions and concerns addressed.       Objective:    Review of Systems:   ROS completed; pertinent positive and negatives stated in subjective.      Vital signs:  Temp:  [98.3 °F (36.8 °C)-100.5 °F (38.1 °C)] 98.9 °F (37.2 °C)  Pulse:  [] 81  Resp:  [16-18] 17  BP: ()/(55-87) 96/56  SpO2:  [97 %-100 %] 97 %      Physical Exam:    Gen: NAD AO x3  Chest: good air entry CTABL  CVS: normal s1 and s2 RR  Abd: NABS soft NT ND  Neuro: CN 2-12 grossly intact  Ext: no edema in bilateral LE      Diagnostic Data:    Labs:  Recent Labs   Lab 24  1720 24  0549   WBC 12.9* 8.8   HGB 12.0 10.9*   MCV 86.8 89.8   .0 208.0       Recent Labs   Lab 24  1720 24  0549   * 85   BUN <5* <5*   CREATSERUM 0.56 0.51*   CA 8.7 8.7   ALB 4.1  --     140   K 3.7 3.9    109   CO2 22.0 25.0   ALKPHO 72  --    AST 16  --    ALT 13  --    BILT 0.3  --    TP 6.9  --        Estimated Creatinine Clearance: 166.8 mL/min (A) (based on SCr of 0.51 mg/dL (L)).    No results for input(s): \"PTP\", \"INR\" in the last 168 hours.           Imaging: Imaging data reviewed in Epic.    Medications:    cefTRIAXone  2 g Intravenous Q24H       Assessment & Plan:     UTI with possible pyelonephritis  Imaging reviewed  UA reviewed  Ucx, Bcx pending  Currently on IV Ceftriaxone  Deescalate abx as indicated  Live pregnancy, gestational age 19 weeks  Ob/Gyn on consult - appreciate recs      Plan of care discussed with patient or family at bedside.      Supplementary Documentation:      Quality:  DVT Prophylaxis: SCDs  CODE status: Not on file    Estimated date of discharge: TBD  Discharge is dependent on: clinical stability  At this point Ms. Montenegro is expected to be discharge to: home                   Dacia Molina MD  Hospitalist    MDM: High, I personally reviewed the available laboratories, imaging including US. I discussed the case with RN. I ordered laboratories, studies including AM labs.  Medical decision making high, risk is high.       The 21st Century Cures Act makes medical notes like these available to patients in the interest of transparency. Please be advised this is a medical document. Medical documents are intended to carry relevant information, facts as evident, and the clinical opinion of the practitioner. The medical note is intended as peer to peer communication and may appear blunt or direct. It is written in medical language and may contain abbreviations or verbiage that are unfamiliar.

## 2024-09-08 VITALS
WEIGHT: 158 LBS | SYSTOLIC BLOOD PRESSURE: 98 MMHG | BODY MASS INDEX: 24.8 KG/M2 | RESPIRATION RATE: 16 BRPM | HEIGHT: 67 IN | TEMPERATURE: 98 F | HEART RATE: 71 BPM | OXYGEN SATURATION: 99 % | DIASTOLIC BLOOD PRESSURE: 59 MMHG

## 2024-09-08 LAB
ALBUMIN SERPL-MCNC: 3.2 G/DL (ref 3.2–4.8)
ALBUMIN/GLOB SERPL: 1.3 {RATIO} (ref 1–2)
ALP LIVER SERPL-CCNC: 64 U/L
ALT SERPL-CCNC: 11 U/L
ANION GAP SERPL CALC-SCNC: 8 MMOL/L (ref 0–18)
AST SERPL-CCNC: 13 U/L (ref ?–34)
BASOPHILS # BLD AUTO: 0.03 X10(3) UL (ref 0–0.2)
BASOPHILS NFR BLD AUTO: 0.4 %
BILIRUB SERPL-MCNC: 0.2 MG/DL (ref 0.3–1.2)
BUN BLD-MCNC: <5 MG/DL (ref 9–23)
CALCIUM BLD-MCNC: 8.3 MG/DL (ref 8.7–10.4)
CHLORIDE SERPL-SCNC: 109 MMOL/L (ref 98–112)
CO2 SERPL-SCNC: 24 MMOL/L (ref 21–32)
CREAT BLD-MCNC: 0.52 MG/DL
DEPRECATED RDW RBC AUTO: 44.6 FL (ref 35.1–46.3)
EGFRCR SERPLBLD CKD-EPI 2021: 134 ML/MIN/1.73M2 (ref 60–?)
EOSINOPHIL # BLD AUTO: 0.25 X10(3) UL (ref 0–0.7)
EOSINOPHIL NFR BLD AUTO: 3.6 %
ERYTHROCYTE [DISTWIDTH] IN BLOOD BY AUTOMATED COUNT: 13.2 % (ref 11–15)
GLOBULIN PLAS-MCNC: 2.4 G/DL (ref 2–3.5)
GLUCOSE BLD-MCNC: 80 MG/DL (ref 70–99)
HCT VFR BLD AUTO: 32.2 %
HGB BLD-MCNC: 10.7 G/DL
IMM GRANULOCYTES # BLD AUTO: 0.03 X10(3) UL (ref 0–1)
IMM GRANULOCYTES NFR BLD: 0.4 %
LYMPHOCYTES # BLD AUTO: 2.61 X10(3) UL (ref 1–4)
LYMPHOCYTES NFR BLD AUTO: 38 %
MCH RBC QN AUTO: 30.1 PG (ref 26–34)
MCHC RBC AUTO-ENTMCNC: 33.2 G/DL (ref 31–37)
MCV RBC AUTO: 90.4 FL
MONOCYTES # BLD AUTO: 0.53 X10(3) UL (ref 0.1–1)
MONOCYTES NFR BLD AUTO: 7.7 %
NEUTROPHILS # BLD AUTO: 3.42 X10 (3) UL (ref 1.5–7.7)
NEUTROPHILS # BLD AUTO: 3.42 X10(3) UL (ref 1.5–7.7)
NEUTROPHILS NFR BLD AUTO: 49.9 %
PLATELET # BLD AUTO: 212 10(3)UL (ref 150–450)
POTASSIUM SERPL-SCNC: 4.2 MMOL/L (ref 3.5–5.1)
PROT SERPL-MCNC: 5.6 G/DL (ref 5.7–8.2)
RBC # BLD AUTO: 3.56 X10(6)UL
SODIUM SERPL-SCNC: 141 MMOL/L (ref 136–145)
WBC # BLD AUTO: 6.9 X10(3) UL (ref 4–11)

## 2024-09-08 PROCEDURE — 99239 HOSP IP/OBS DSCHRG MGMT >30: CPT | Performed by: INTERNAL MEDICINE

## 2024-09-08 RX ORDER — SULFAMETHOXAZOLE/TRIMETHOPRIM 800-160 MG
1 TABLET ORAL 2 TIMES DAILY
Qty: 20 TABLET | Refills: 0 | Status: SHIPPED | OUTPATIENT
Start: 2024-09-08 | End: 2024-09-18

## 2024-09-08 NOTE — DISCHARGE PLANNING
Patient discharging home with family. Education provided. All questions answered. Nurse  checked and emptied.

## 2024-09-08 NOTE — PLAN OF CARE
Problem: Patient Centered Care  Goal: Patient preferences are identified and integrated in the patient's plan of care  Description: Interventions:  - What would you like us to know as we care for you? Currently 19 weeks pregnant  - Provide timely, complete, and accurate information to patient/family  - Incorporate patient and family knowledge, values, beliefs, and cultural backgrounds into the planning and delivery of care  - Encourage patient/family to participate in care and decision-making at the level they choose  - Honor patient and family perspectives and choices  Outcome: Progressing     Problem: Patient/Family Goals  Goal: Patient/Family Long Term Goal  Description: Patient's Long Term Goal: discharge from hospital    Interventions:  - monitor vital signs   - monitor appropriate labs  - pain management   - administer medications per order  - follow MD orders  - diagnostics per order  - update and inform patient and family on plan of care  - discharge planning  - See additional Care Plan goals for specific interventions  Outcome: Progressing  Goal: Patient/Family Short Term Goal  Description: Patient's Short Term Goal: improve urinary tract infection    Interventions:   - monitor vital signs   - monitor appropriate labs  - pain management   - administer medications per order  - follow MD orders  - diagnostics per order  - update and inform patient and family on plan of care  - See additional Care Plan goals for specific interventions  Outcome: Progressing     Problem: PAIN - ADULT  Goal: Verbalizes/displays adequate comfort level or patient's stated pain goal  Description: INTERVENTIONS:  - Encourage pt to monitor pain and request assistance  - Assess pain using appropriate pain scale  - Administer analgesics based on type and severity of pain and evaluate response  - Implement non-pharmacological measures as appropriate and evaluate response  - Consider cultural and social influences on pain and pain  management  - Manage/alleviate anxiety  - Utilize distraction and/or relaxation techniques  - Monitor for opioid side effects  - Notify MD/LIP if interventions unsuccessful or patient reports new pain  - Anticipate increased pain with activity and pre-medicate as appropriate  Outcome: Progressing     Problem: SAFETY ADULT - FALL  Goal: Free from fall injury  Description: INTERVENTIONS:  - Assess pt frequently for physical needs  - Identify cognitive and physical deficits and behaviors that affect risk of falls.  - [place] fall precautions as indicated by assessment.  - Educate pt/family on patient safety including physical limitations  - Instruct pt to call for assistance with activity based on assessment  - Modify environment to reduce risk of injury  - Provide assistive devices as appropriate  - Consider OT/PT consult to assist with strengthening/mobility  - Encourage toileting schedule  Outcome: Progressing     Problem: DISCHARGE PLANNING  Goal: Discharge to home or other facility with appropriate resources  Description: INTERVENTIONS:  - Identify barriers to discharge w/pt and caregiver  - Include patient/family/discharge partner in discharge planning  - Arrange for needed discharge resources and transportation as appropriate  - Identify discharge learning needs (meds, wound care, etc)  - Arrange for interpreters to assist at discharge as needed  - Consider post-discharge preferences of patient/family/discharge partner  - Complete POLST form as appropriate  - Assess patient's ability to be responsible for managing their own health  - Refer to Case Management Department for coordinating discharge planning if the patient needs post-hospital services based on physician/LIP order or complex needs related to functional status, cognitive ability or social support system  Outcome: Progressing     Problem: Altered Communication/Language Barrier  Goal: Patient/Family is able to understand and participate in their  care  Description: Interventions:  - Assess communication ability and preferred communication style  - Implement communication aides and strategies  - Use visual cues when possible  - Listen attentively, be patient, do not interrupt  - Minimize distractions  - Allow time for understanding and response  - Establish method for patient to ask for assistance (call light)  - Provide an  as needed  - Communicate barriers and strategies to overcome with those who interact with patient  Outcome: Progressing     Problem: GASTROINTESTINAL - ADULT  Goal: Minimal or absence of nausea and vomiting  Description: INTERVENTIONS:  - Maintain adequate hydration with IV or PO as ordered and tolerated  - Nasogastric tube to low intermittent suction as ordered  - Evaluate effectiveness of ordered antiemetic medications  - Provide nonpharmacologic comfort measures as appropriate  - Advance diet as tolerated, if ordered  - Obtain nutritional consult as needed  - Evaluate fluid balance  Outcome: Progressing  Goal: Maintains adequate nutritional intake (undernourished)  Description: INTERVENTIONS:  - Monitor percentage of each meal consumed  - Identify factors contributing to decreased intake, treat as appropriate  - Assist with meals as needed  - Monitor I&O, WT and lab values  - Obtain nutritional consult as needed  - Optimize oral hygiene and moisture  - Encourage food from home; allow for food preferences  - Enhance eating environment  Outcome: Progressing     Problem: GENITOURINARY - ADULT  Goal: Absence of urinary retention  Description: INTERVENTIONS:  - Assess patient’s ability to void and empty bladder  - Monitor intake/output and perform bladder scan as needed  - Follow urinary retention protocol/standard of care  - Consider collaborating with pharmacy to review patient's medication profile  - Implement strategies to promote bladder emptying  Outcome: Progressing     Problem: SKIN/TISSUE INTEGRITY - ADULT  Goal: Skin  integrity remains intact  Description: INTERVENTIONS  - Assess and document risk factors for pressure ulcer development  - Assess and document skin integrity  - Monitor for areas of redness and/or skin breakdown  - Initiate interventions, skin care algorithm/standards of care as needed  Outcome: Progressing      Monitoring vital signs- see flowsheets. No acute changes noted at this moment. Safety and fall precautions maintained- I-bed awareness on, bed locked in lowest position, call light within reach. Frequent rounding by nursing staff.

## 2024-09-08 NOTE — DISCHARGE SUMMARY
[facility]  part of [facility]    DISCHARGE SUMMARY     Lena Montenegro Patient Status:  Inpatient    2000 MRN [id number]   Location [facility] 5SW/SE Attending Dacia Molina MD   Hosp Day # 2 PCP None Pcp     Date of Admission: 2024  Date of Discharge:  2024    Discharge Disposition: [facility] Emergency Room    Discharge Diagnosis:     UTI with possible pyelonephritis  Live pregnancy, gestational age 19 weeks    History of Present Illness:     The patient is a 24-year-old  female who came into the emergency department for evaluation of night sweats, body aches, and fever. Urinalysis showed evidence of possible urinary tract infection. She had been on Keflex as an outpatient recently without significant improvement in her symptoms. Today in the emergency room, she had transvaginal ultrasound which showed single live intrauterine gestation in cephalic presentation 19 weeks 4 days estimated gestational age. Kidney ultrasound showed was negative. Bladder is only mildly distended. Patient was given IV Rocephin, and she will be admitted to the hospital for further management.     Brief Synopsis:     UTI with possible pyelonephritis  Imaging reviewed  UA reviewed  Ucx, Bcx showing NGTD  Currently on IV Ceftriaxone  Discharging on PO Bactrim for 10 days per ObGyn recs followed by ppx abx - tbd on follow up  Live pregnancy, gestational age 19 weeks  Ob/Gyn on consult - appreciate recs    Patient is to remain compliant with all discharge medications and instructions and to follow up as advised.   Patient encouraged to make healthy lifestyle and dietary changes.    Lace+ Score: 22  59-90 High Risk  29-58 Medium Risk  0-28   Low Risk       TCM Follow-Up Recommendation:  LACE < 29: Low Risk of readmission after discharge from the hospital; Still recommend for TCM follow-up.      Procedures during hospitalization:   None     Incidental or significant findings and recommendations  (brief descriptions):  None    Lab/Test results pending at Discharge:   None    Consultants:  ObGyn    Discharge Medication List:     Discharge Medications        START taking these medications        Instructions Prescription details   sulfamethoxazole-trimethoprim -160 MG Tabs per tablet  Commonly known as: Bactrim DS      Take 1 tablet by mouth 2 (two) times daily for 10 days.   Stop taking on: 2024  Quantity: 20 tablet  Refills: 0            CONTINUE taking these medications        Instructions Prescription details   PRE- OR      Take 1 tablet by mouth daily. Taking Plena Fem pre    Refills: 0            STOP taking these medications      cephalexin 250 MG Caps  Commonly known as: Keflex                  Where to Get Your Medications        Information about where to get these medications is not yet available    Ask your nurse or doctor about these medications  sulfamethoxazole-trimethoprim -160 MG Tabs per tablet         [place]    Follow-up appointment:   [facility] - OB/GYN  [address] [address] [phone]  Schedule an appointment as soon as possible for a visit in 3 day(s)  prenatal care and follow up for pyelonephritis    Pcp, None  [facility] [address]    Follow up in 1 week(s)      Appointments for Next 30 Days 2024 - 10/8/2024      None            Vital signs:  Temp:  [97.4 °F (36.3 °C)-97.8 °F (36.6 °C)] 97.8 °F (36.6 °C)  Pulse:  [71-84] 71  Resp:  [16-18] 16  BP: ()/(47-59) 101/58  SpO2:  [97 %-99 %] 97 %    Physical Exam:    Gen: NAD AO x3  Chest: good air entry CTABL  CVS: normal s1 and s2 RR  Abd: NABS soft NT ND  Neuro: CN 2-12 grossly intact  Ext: no edema in bilateral LE    -----------------------------------------------------------------------------------------------  PATIENT DISCHARGE INSTRUCTIONS: See electronic chart    Dacia Molina MD  Hospitalist    Time spent:   > 30 minutes    The 21st Century Cures Act makes medical notes like these available to patients in the interest of transparency. Please be advised this is a medical document. Medical documents are intended to carry relevant information, facts as evident, and the clinical opinion of the practitioner. The medical note is intended as peer to peer communication and may appear blunt or direct. It is written in medical language and may contain abbreviations or verbiage that are unfamiliar.

## 2024-09-08 NOTE — PLAN OF CARE
Problem: Patient Centered Care  Goal: Patient preferences are identified and integrated in the patient's plan of care  Description: Interventions:  - What would you like us to know as we care for you? Currently 19 weeks pregnant  - Provide timely, complete, and accurate information to patient/family  - Incorporate patient and family knowledge, values, beliefs, and cultural backgrounds into the planning and delivery of care  - Encourage patient/family to participate in care and decision-making at the level they choose  - Honor patient and family perspectives and choices  Outcome: Adequate for Discharge     Problem: Patient/Family Goals  Goal: Patient/Family Long Term Goal  Description: Patient's Long Term Goal: discharge from hospital    Interventions:  - monitor vital signs   - monitor appropriate labs  - pain management   - administer medications per order  - follow MD orders  - diagnostics per order  - update and inform patient and family on plan of care  - discharge planning  - See additional Care Plan goals for specific interventions  Outcome: Adequate for Discharge  Goal: Patient/Family Short Term Goal  Description: Patient's Short Term Goal: improve urinary tract infection    Interventions:   - monitor vital signs   - monitor appropriate labs  - pain management   - administer medications per order  - follow MD orders  - diagnostics per order  - update and inform patient and family on plan of care  - See additional Care Plan goals for specific interventions  Outcome: Adequate for Discharge     Problem: PAIN - ADULT  Goal: Verbalizes/displays adequate comfort level or patient's stated pain goal  Description: INTERVENTIONS:  - Encourage pt to monitor pain and request assistance  - Assess pain using appropriate pain scale  - Administer analgesics based on type and severity of pain and evaluate response  - Implement non-pharmacological measures as appropriate and evaluate response  - Consider cultural and social  influences on pain and pain management  - Manage/alleviate anxiety  - Utilize distraction and/or relaxation techniques  - Monitor for opioid side effects  - Notify MD/LIP if interventions unsuccessful or patient reports new pain  - Anticipate increased pain with activity and pre-medicate as appropriate  Outcome: Adequate for Discharge     Problem: SAFETY ADULT - FALL  Goal: Free from fall injury  Description: INTERVENTIONS:  - Assess pt frequently for physical needs  - Identify cognitive and physical deficits and behaviors that affect risk of falls.  - [place] fall precautions as indicated by assessment.  - Educate pt/family on patient safety including physical limitations  - Instruct pt to call for assistance with activity based on assessment  - Modify environment to reduce risk of injury  - Provide assistive devices as appropriate  - Consider OT/PT consult to assist with strengthening/mobility  - Encourage toileting schedule  Outcome: Adequate for Discharge     Problem: DISCHARGE PLANNING  Goal: Discharge to home or other facility with appropriate resources  Description: INTERVENTIONS:  - Identify barriers to discharge w/pt and caregiver  - Include patient/family/discharge partner in discharge planning  - Arrange for needed discharge resources and transportation as appropriate  - Identify discharge learning needs (meds, wound care, etc)  - Arrange for interpreters to assist at discharge as needed  - Consider post-discharge preferences of patient/family/discharge partner  - Complete POLST form as appropriate  - Assess patient's ability to be responsible for managing their own health  - Refer to Case Management Department for coordinating discharge planning if the patient needs post-hospital services based on physician/LIP order or complex needs related to functional status, cognitive ability or social support system  Outcome: Adequate for Discharge     Problem: Altered Communication/Language Barrier  Goal:  Patient/Family is able to understand and participate in their care  Description: Interventions:  - Assess communication ability and preferred communication style  - Implement communication aides and strategies  - Use visual cues when possible  - Listen attentively, be patient, do not interrupt  - Minimize distractions  - Allow time for understanding and response  - Establish method for patient to ask for assistance (call light)  - Provide an  as needed  - Communicate barriers and strategies to overcome with those who interact with patient  Outcome: Adequate for Discharge     Problem: GASTROINTESTINAL - ADULT  Goal: Minimal or absence of nausea and vomiting  Description: INTERVENTIONS:  - Maintain adequate hydration with IV or PO as ordered and tolerated  - Nasogastric tube to low intermittent suction as ordered  - Evaluate effectiveness of ordered antiemetic medications  - Provide nonpharmacologic comfort measures as appropriate  - Advance diet as tolerated, if ordered  - Obtain nutritional consult as needed  - Evaluate fluid balance  Outcome: Adequate for Discharge  Goal: Maintains adequate nutritional intake (undernourished)  Description: INTERVENTIONS:  - Monitor percentage of each meal consumed  - Identify factors contributing to decreased intake, treat as appropriate  - Assist with meals as needed  - Monitor I&O, WT and lab values  - Obtain nutritional consult as needed  - Optimize oral hygiene and moisture  - Encourage food from home; allow for food preferences  - Enhance eating environment  Outcome: Adequate for Discharge     Problem: GENITOURINARY - ADULT  Goal: Absence of urinary retention  Description: INTERVENTIONS:  - Assess patient’s ability to void and empty bladder  - Monitor intake/output and perform bladder scan as needed  - Follow urinary retention protocol/standard of care  - Consider collaborating with pharmacy to review patient's medication profile  - Implement strategies to promote  bladder emptying  Outcome: Adequate for Discharge     Problem: SKIN/TISSUE INTEGRITY - ADULT  Goal: Skin integrity remains intact  Description: INTERVENTIONS  - Assess and document risk factors for pressure ulcer development  - Assess and document skin integrity  - Monitor for areas of redness and/or skin breakdown  - Initiate interventions, skin care algorithm/standards of care as needed  Outcome: Adequate for Discharge

## 2024-09-10 NOTE — PAYOR COMM NOTE
--------------  ADMISSION REVIEW     Payor: [employer]   Subscriber #:  [id number]  Authorization Number: [id number]    Admit date: 24  Admit time: 10:41 PM       History   HPI  This is an otherwise healthy 24-year-old -0-0-1 approximately 16 weeks gestation, has not established with an OB in the [place] yet but had an ultrasound done in [place] at the end of 2024, presents to the ER for evaluation of pelvic pain, left flank pain, headache, subjective fever, chills, feeling unwell.  Bul[place]ian interpretation utilized throughout encounter and patient's  also contributes to history.  She states over the past couple of days she has developed multiple symptoms including dysuria and burning when she urinates, slight suprapubic pressure, and now today is having left flank pain.  She reports chills last night though did not take a temperature at home she thinks she could have had a fever.  She reports nausea and poor p.o. intake without vomiting, diarrhea or bloody stool.  Denies hematuria.  No vaginal bleeding or discharge.  She also reports a generalized headache since yesterday in the front.  Denies numbness weakness or tingling in her extremities or vision changes.  She feels aching all over her body.  She does report mild sore throat but no cough or congestion.  No chest pain or difficulty breathing.  She was seen at urgent care yesterday and started on cephalexin but her symptoms haven't improved. She was sent to the ER today from  due to her symptoms for further evaluation.     ED Triage Vitals   BP 24 1659 156/87   Pulse 24 1659 108   Resp 24 1659 18   Temp 24 1659 99.4 °F (37.4 °C)   Temp src 24 1659 Temporal   SpO2 24 1659 100 %   O2 Device 24 2244 None (Room air)   HEENT: atraumatic, normocephalic, no pharyngeal swelling or tonsillar enlargement   EYES: sclera non icteric, conjunctiva non-injected, PERRLA and EOMI bilaterally   NECK:  supple, no meningismus, FROM   LUNGS:no resp distress, lungs CTAB no wheezes or crackles.   CARDIO: tachycardic with regular rhythm, no murmur, 2+ radial and dp pulses bilaterally   EXTREMITIES: no cyanosis, clubbing or edema  BACK: no midline c/t/l spine ttp. No stepoff or deformity. +L CVA tenderness, no R CVAT.   GI: soft, mild suprapubic tenderness, no guarding or rebound. Abdomen gravid below umbilicus.   NEURO: Strength is 5/5 bilateral upper extremities on handgrip, elbow flexion and extension, hip flexion, knee flexion and extension and ankle plantarflexion and dorsiflexion, sensation tact light touch and symmetric bilateral upper and lower extremities    Labs Reviewed   URINALYSIS WITH CULTURE REFLEX - Abnormal; Notable for the following components:       Result Value    Ketones Urine 40 (*)     Protein Urine 20 (*)     Leukocyte Esterase Urine 75 (*)     WBC Urine 6-10 (*)     Bacteria Urine Rare (*)     Squamous Epi. Cells Few (*)     All other components within normal limits   CBC WITH DIFFERENTIAL WITH PLATELET - Abnormal; Notable for the following components:    WBC 12.9 (*)     Neutrophil Absolute Prelim 10.64 (*)     Neutrophil Absolute 10.64 (*)     All other components within normal limits   COMP METABOLIC PANEL (14) - Abnormal; Notable for the following components:    Glucose 115 (*)     BUN <5 (*)      Disposition and Plan   Clinical Impression:  1. Pyelonephritis    2. Second trimester pregnancy (HCC)    3. Left flank pain       Disposition:  Admit  9/6/2024  7:17 pm    HISTORY AND PHYSICAL EXAMINATION     CHIEF COMPLAINT:  Urinary tract infection, possible pyelonephritis.     HISTORY OF PRESENT ILLNESS:  The patient is a 24-year-old  female who came into the emergency department for evaluation of night sweats, body aches, and fever.  Urinalysis showed evidence of possible urinary tract infection.  She had been on Keflex as an outpatient recently without significant improvement in her  symptoms.  Today in the emergency room, she had transvaginal ultrasound which showed single live intrauterine gestation in cephalic presentation 19 weeks 4 days estimated gestational age.  Kidney ultrasound showed was negative.  Bladder is only mildly distended.  Patient was given IV Rocephin, and she will be admitted to the hospital for further management.     PAST MEDICAL HISTORY:   1, para 2, currently 19-week pregnant.  No other medical problems.    REVIEW OF SYSTEMS:  Body aches, lower back pain, fever, chills, and night sweats for last 3 days but no dysuria.    PHYSICAL EXAMINATION:    GENERAL:  Alert and oriented to time, place, and person.  No acute distress.  VITAL SIGNS:  Temperature 100.5, pulse 108, respiratory rate 18, blood pressure 100/55, pulse ox 100% on room air.   HEENT:  Atraumatic.  Oropharynx clear.  Dry mucous membranes.  NECK:  Supple.  No lymphadenopathy.  Trachea midline.  Full range of motion.  LUNGS:  Clear to auscultation bilaterally.  Normal respiratory effort.   HEART:  Regular rate, rhythm.  S1 and S2 auscultated.  No murmur.  ABDOMEN:  Soft, nondistended.  No tenderness.  Positive bowel sounds.   EXTREMITIES:  No peripheral edema, clubbing, or cyanosis.  NEUROLOGIC:  Motor and sensory intact.     ASSESSMENT:    1.       Urinary tract infection, possible pyelonephritis.  2.       Gestational age 19 weeks, live pregnancy.     PLAN:  Patient will be admitted to general medical floor.  IV Rocephin.  IV fluids.  OB/GYN consult.  Further recommendations to follow.         24  Temp:  [98.3 °F (36.8 °C)-100.5 °F (38.1 °C)] 98.9 °F (37.2 °C)  Pulse:  [] 81  Resp:  [16-18] 17  BP: ()/(55-87) 96/56  SpO2:  [97 %-100 %] 97 %      Physical Exam:    Gen:  AO x3  Chest: good air entry CTABL  CVS: normal s1 and s2 RR  Abd: NABS soft NT ND  Neuro: CN 2-12 grossly intact  Ext: no edema in bilateral LE     Lab 24  1720 24  0549   WBC 12.9* 8.8   HGB 12.0 10.9*   MCV  86.8 89.8   .0 208.0      Lab 24  1720 24  0549   * 85   BUN <5* <5*   CREATSERUM 0.56 0.51*   CA 8.7 8.7   ALB 4.1  --     140   K 3.7 3.9    109   CO2 22.0 25.0   ALKPHO 72  --    AST 16  --    ALT 13  --    BILT 0.3  --    TP 6.9  --     Medications:    cefTRIAXone  2 g Intravenous Q24H       Assessment & Plan:      UTI with possible pyelonephritis  Imaging reviewed  UA reviewed  Ucx, Bcx pending  Currently on IV Ceftriaxone  Deescalate abx as indicated  Live pregnancy, gestational age 19 weeks  Ob/Gyn on consult - appreciate recs        OB/GYN  Assessment:    Lena Montenegro is a 24 year old  @19/5wks by ultrasound, admitted on 2024 for treatment pyelonephritis in pregnancy . Request for OBGYN consultation was made.   Wbc normal.  Symptoms improving.       Plan:   Continue Ceftriaxone. Can likely go home tomorrow from OB standpoint if patient has continued improvement and remains afebrile >48 hours. Recommend 10 days BactrimDS (ok in second trimester). Will then need prophylaxis for rest of pregnancy based on urine culture.   Follow up for routine prenatal care. Will provide office information.    DATE OF DISCHARGE: 24      Vitals (last day) before discharge       Date/Time Temp Pulse Resp BP SpO2 Weight O2 Device O2 Flow Rate (L/min) Who    24 0952 97.6 °F (36.4 °C) -- 16 98/59 99 % -- None (Room air) -- JR    24 0508 97.8 °F (36.6 °C) 71 16 101/58 97 % -- None (Room air) -- MW    24 1501 97.4 °F (36.3 °C) 80 18 93/47 99 % -- None (Room air) -- YD    24 1011 97.8 °F (36.6 °C) 80 18 93/59 98 % -- None (Room air) -- YD    24 0528 98.9 °F (37.2 °C) 81 17 96/56 97 % -- None (Room air) --

## 2024-09-11 PROBLEM — R10.9 LEFT FLANK PAIN: Status: RESOLVED | Noted: 2024-09-06 | Resolved: 2024-09-11

## 2024-09-11 PROBLEM — R10.9 ABDOMINAL PAIN, ACUTE: Status: RESOLVED | Noted: 2024-09-06 | Resolved: 2024-09-11

## 2024-09-11 NOTE — PROGRESS NOTES
[facility]  Obstetrics and Gynecology    Subjective:     Lena Montenegro is a 24 year old ,female, presenting to [facility] care. She is here with her  who translates for her. She declined formal .     She is approximately 19wga by recent ultrasound performed in the ED.     Patient was hospitalized on  after she presented to the hospital with flank pain and fevers and was diagnosed with pyelonephritis. She improved after IV abx and was subsequently discharged with PO abx to complete a 2 week course of treatment. Today she reports feeling better.     Use of Birth Control (if yes, specify type): None (2024 11:33 AM)  Pap Result Notes: pt hasnt had a pap (2024 11:33 AM)    Feels safe at home   STD hx: denies  Has never had Pap     Review of Systems:  Review of Systems   Constitutional:  Negative for appetite change and fever.   Respiratory:  Negative for chest tightness and shortness of breath.    Cardiovascular:  Negative for chest pain, palpitations and leg swelling.   Gastrointestinal:  Negative for abdominal pain, constipation and diarrhea.   Endocrine: Negative for cold intolerance, heat intolerance, polydipsia and polyuria.   Genitourinary: Negative.  Negative for dyspareunia, dysuria, genital sores, menstrual problem, pelvic pain, urgency and vaginal discharge.   Musculoskeletal:  Negative for myalgias.   Neurological: Negative.  Negative for dizziness and headaches.   Psychiatric/Behavioral:  Negative for dysphoric mood and suicidal ideas.        OB History    Para Term  AB Living   2 1 1 0 0 1   SAB IAB Ectopic Multiple Live Births   0 0 0 0 1       Past Medical History:    Patient denies medical problems       Past Surgical History:   Procedure Laterality Date    Patient denies any surgical history         Social History     Socioeconomic History    Marital status:    Tobacco Use    Smoking status: Never    Smokeless tobacco:  Never   Vaping Use    Vaping status: Never Used   Substance and Sexual Activity    Alcohol use: Not Currently    Drug use: Never    Sexual activity: Yes     Partners: Male   Other Topics Concern    Blood Transfusions No       Family History   Problem Relation Age of Onset    Other (liver cancer) Maternal Grandmother          Current Outpatient Medications:     sulfamethoxazole-trimethoprim -160 MG Oral Tab per tablet, Take 1 tablet by mouth 2 (two) times daily for 10 days., Disp: 20 tablet, Rfl: 0    Prenatal Multivit-Min-Fe-FA (PRE-JAIR OR), Take 1 tablet by mouth daily. Taking Plena Fem pre , Disp: , Rfl:       Health maintenance:  Health Maintenance   Topic Date Due    Annual Physical  Never done    HPV Vaccines (1 - 3-dose series) Never done    Chlamydia Screening  Never done    DTaP,Tdap,and Td Vaccines (1 - Tdap) Never done    Pap Smear  Never done    COVID-19 Vaccine ( -  season) Never done    Influenza Vaccine (1) 10/01/2024    Annual Depression Screening  Completed    Pneumococcal Vaccine: Birth to 64yrs  Aged Out        Objective:     Vitals:    24 1131   BP: 104/62   Weight: 161 lb 8 oz (73.3 kg)   Height: 67\"       Body mass index is 25.29 kg/m².    GENERAL: well developed, well nourished, in no apparent distress, alert and orientated X 3  PSYCH: mood and affect stable   SKIN: no rashes, no lesions  HEENT: normal  LUNGS: respiration unlabored  CARDIOVASCULAR: no peripheral edema or varicosities, skin warm and dry  BREASTS: bilaterally nontender, no palpable masses, no nipple discharge, no skin changes, no axillary adenopathy  ABDOMEN: Soft, non distended; non tender, no masses  GYNE/:   External Genitalia: normal, no lesions, good perineal support  Urethra: meatus normal   Bladder: well supported  Vagina: normal mucosa, no lesions, no discharge   Uterus: normal size, mobile, nontender  Cervix: normal os, no lesions or bleeding  Adnexa:normal size, bilaterally nontender, no  palpable masses  Cul-de-sac: normal  R/V: normal perineum, no hemorrhoids  EXTREMITIES: nontender without edema    Labs:  POC urine pregnancy test : Positive     Imaging:  US PREGNANCY LTD (CPT=76815)    Result Date: 2024  CONCLUSION:  1. Single live intrauterine gestation in cephalic presentation.  Average ultrasound age based on today's exam 19 weeks 4 days +/-1 week and 3 days corresponding to LILLY of 2025. 2. Incidental 3 mm left choroid plexus cyst.  Follow-up nonemergent complete fetal anatomical survey recommended.      Dictated by (CST): Segundo Herrera MD on 2024 at 7:27 PM     Finalized by (CST): Segundo Herrera MD on 2024 at 7:33 PM               Assessment:     Lena Montenegro is a 24 year old  female who presents for missed menses and positive pregnancy test    Patient's last menstrual period was 2024 (exact date). At approximately 20wga by second trimester ultrasound       Plan:       Pregnancy   - positive pregnancy test  - level II ultrasound ordered   - Pt counseled on safety, diet, exercise, caffiene, tobacco, ETOH, sexual activity, ER precautions, diet, exercise, appropriate otc medication use, substance abuse   - Counseled on taking a PNV with folic acid   - genetic screening testing d/w patient and family, patient accepts  - considering carrier screening   - advised to follow up to establish prenatal care - referred for RN education visit  - SAB precautions provided   - d/w nausea and vomiting in pregnancy including vitamin B 6 and unisom   - flu/covid vaccines - discussed     Pyelonephritis  Admitted  for IV abx, reports history of recurrent UTI in prior pregnancy but never outside of pregnancy  Discharged with 10 day course of bactrim, feeling much improved today   Ucx with no growth  Rx for Keflex suppression sent to pharmacy  Discussed pathophysiology of recurrent UTI and predisposition to more severe infection in pregnancy       All of the  findings and plan were discussed with the patient.  She notes understanding and agrees with the plan of care.  All questions were answered to the best of my ability at this time.    RTC in 4 weeks or sooner if needed     Annabelle Camp, DO

## 2024-09-12 ENCOUNTER — LAB ENCOUNTER (OUTPATIENT)
Dept: LAB | Age: 24
End: 2024-09-12
Attending: OBSTETRICS & GYNECOLOGY
Payer: COMMERCIAL

## 2024-09-12 ENCOUNTER — OFFICE VISIT (OUTPATIENT)
Dept: OBGYN CLINIC | Facility: CLINIC | Age: 24
End: 2024-09-12
Payer: COMMERCIAL

## 2024-09-12 VITALS
HEIGHT: 67 IN | DIASTOLIC BLOOD PRESSURE: 62 MMHG | SYSTOLIC BLOOD PRESSURE: 104 MMHG | BODY MASS INDEX: 25.35 KG/M2 | WEIGHT: 161.5 LBS

## 2024-09-12 DIAGNOSIS — Z3A.19 19 WEEKS GESTATION OF PREGNANCY (HCC): ICD-10-CM

## 2024-09-12 DIAGNOSIS — Z34.82 ENCOUNTER FOR SUPERVISION OF OTHER NORMAL PREGNANCY IN SECOND TRIMESTER (HCC): Primary | ICD-10-CM

## 2024-09-12 DIAGNOSIS — Z34.82 ENCOUNTER FOR SUPERVISION OF OTHER NORMAL PREGNANCY IN SECOND TRIMESTER (HCC): ICD-10-CM

## 2024-09-12 DIAGNOSIS — Z11.3 SCREENING EXAMINATION FOR STI: ICD-10-CM

## 2024-09-12 DIAGNOSIS — Z12.4 SCREENING FOR CERVICAL CANCER: ICD-10-CM

## 2024-09-12 LAB
ANTIBODY SCREEN: NEGATIVE
BASOPHILS # BLD AUTO: 0.04 X10(3) UL (ref 0–0.2)
BASOPHILS NFR BLD AUTO: 0.4 %
DEPRECATED RDW RBC AUTO: 42.3 FL (ref 35.1–46.3)
EOSINOPHIL # BLD AUTO: 0.32 X10(3) UL (ref 0–0.7)
EOSINOPHIL NFR BLD AUTO: 3.1 %
ERYTHROCYTE [DISTWIDTH] IN BLOOD BY AUTOMATED COUNT: 13.1 % (ref 11–15)
HBV SURFACE AG SER-ACNC: <0.1 [IU]/L
HBV SURFACE AG SERPL QL IA: NONREACTIVE
HCT VFR BLD AUTO: 34 %
HGB BLD-MCNC: 11.7 G/DL
IMM GRANULOCYTES # BLD AUTO: 0.06 X10(3) UL (ref 0–1)
IMM GRANULOCYTES NFR BLD: 0.6 %
LYMPHOCYTES # BLD AUTO: 2.61 X10(3) UL (ref 1–4)
LYMPHOCYTES NFR BLD AUTO: 25.2 %
MCH RBC QN AUTO: 30.5 PG (ref 26–34)
MCHC RBC AUTO-ENTMCNC: 34.4 G/DL (ref 31–37)
MCV RBC AUTO: 88.5 FL
MONOCYTES # BLD AUTO: 0.44 X10(3) UL (ref 0.1–1)
MONOCYTES NFR BLD AUTO: 4.3 %
NEUTROPHILS # BLD AUTO: 6.87 X10 (3) UL (ref 1.5–7.7)
NEUTROPHILS # BLD AUTO: 6.87 X10(3) UL (ref 1.5–7.7)
NEUTROPHILS NFR BLD AUTO: 66.4 %
PLATELET # BLD AUTO: 308 10(3)UL (ref 150–450)
RBC # BLD AUTO: 3.84 X10(6)UL
RH BLOOD TYPE: POSITIVE
RUBV IGG SER QL: POSITIVE
RUBV IGG SER-ACNC: 77 IU/ML (ref 10–?)
T PALLIDUM AB SER QL IA: NONREACTIVE
WBC # BLD AUTO: 10.3 X10(3) UL (ref 4–11)

## 2024-09-12 PROCEDURE — 87624 HPV HI-RISK TYP POOLED RSLT: CPT | Performed by: OBSTETRICS & GYNECOLOGY

## 2024-09-12 PROCEDURE — 86762 RUBELLA ANTIBODY: CPT

## 2024-09-12 PROCEDURE — 83020 HEMOGLOBIN ELECTROPHORESIS: CPT

## 2024-09-12 PROCEDURE — 87340 HEPATITIS B SURFACE AG IA: CPT

## 2024-09-12 PROCEDURE — 82105 ALPHA-FETOPROTEIN SERUM: CPT

## 2024-09-12 PROCEDURE — 86780 TREPONEMA PALLIDUM: CPT

## 2024-09-12 PROCEDURE — 86803 HEPATITIS C AB TEST: CPT

## 2024-09-12 PROCEDURE — 36415 COLL VENOUS BLD VENIPUNCTURE: CPT

## 2024-09-12 PROCEDURE — 87086 URINE CULTURE/COLONY COUNT: CPT

## 2024-09-12 PROCEDURE — 86900 BLOOD TYPING SEROLOGIC ABO: CPT

## 2024-09-12 PROCEDURE — 85025 COMPLETE CBC W/AUTO DIFF WBC: CPT

## 2024-09-12 PROCEDURE — 87389 HIV-1 AG W/HIV-1&-2 AB AG IA: CPT

## 2024-09-12 PROCEDURE — 86901 BLOOD TYPING SEROLOGIC RH(D): CPT

## 2024-09-12 PROCEDURE — 83021 HEMOGLOBIN CHROMOTOGRAPHY: CPT

## 2024-09-12 PROCEDURE — 99213 OFFICE O/P EST LOW 20 MIN: CPT | Performed by: OBSTETRICS & GYNECOLOGY

## 2024-09-12 PROCEDURE — 86850 RBC ANTIBODY SCREEN: CPT

## 2024-09-12 NOTE — ASSESSMENT & PLAN NOTE
Admitted 9/8 for IV abx, reports history of recurrent UTI in prior pregnancy but never outside of pregnancy  Discharged with 10 day course of bactrim, feeling much improved today   Ucx with no growth  Rx for Keflex suppression sent to pharmacy  Discussed pathophysiology of recurrent UTI and predisposition to more severe infection in pregnancy

## 2024-09-13 LAB — HCV AB SERPL QL IA: NONREACTIVE

## 2024-09-15 LAB
AFP MOM: 1.15
AFP VALUE: 67.3 NG/ML
GA ON COLL DATE: 20.7 WEEKS
INSULIN DEP AFP: NO
MAT AGE AT EDD: 23.6 YR
MULTIPLE GEST AFP: NO
OSBR RISK 1 IN AFP: 7603
WEIGHT AFP: 161 LBS

## 2024-09-16 LAB — HPV E6+E7 MRNA CVX QL NAA+PROBE: NEGATIVE

## 2024-09-17 ENCOUNTER — NURSE ONLY (OUTPATIENT)
Dept: OBGYN CLINIC | Facility: CLINIC | Age: 24
End: 2024-09-17
Payer: COMMERCIAL

## 2024-09-17 ENCOUNTER — ULTRASOUND ENCOUNTER (OUTPATIENT)
Dept: OBGYN CLINIC | Facility: CLINIC | Age: 24
End: 2024-09-17
Payer: COMMERCIAL

## 2024-09-17 DIAGNOSIS — Z34.82 ENCOUNTER FOR SUPERVISION OF OTHER NORMAL PREGNANCY IN SECOND TRIMESTER (HCC): Primary | ICD-10-CM

## 2024-09-17 NOTE — PROGRESS NOTES
Pt is a G 2  P  1 for RN OB Education.       Pregnancy Confirmation apt with: MD    LMP: 2024    US:  today 21.1 weeks    Working LILLY:  2025    Pre  BMI:   25.29    Medical Hx significant for: denies    Obstetrical Hx significant for:  x1    Surgical Hx significant for: denies    EPDS score: 1    OUD Screening: Patient has answered NO to 5p questions and has no  risk factors.     Patient given \"What Pregnant Women Need to Know\" handout.     Educational material reviewed with patient: Prenatal care, nutrition, weight gain recommendations, travel, exercise, intercourse, pregnancy changes, safe medications, pregnancy and work, fetal movement, labor and  labor, warning signs, food safety, tdap, cord blood, breastfeeding, circumcision, and Group B strep.     Pt agrees to blood transfusion if needed: yes    PN labs ordered     Optional genetic screening discussed.  Pt completed Prequel, pending results:    [facility] Media Policy: Reviewed and verbalized understanding.     NOB appt: 10/10/2024  with Dr. Camp    Lab appt: completed    Vaccines: informed Dtap, RSV, covid and flu    ASA protocol :  n/a    SODH:  to see Dr. Jaquez on 10/10/2024

## 2024-09-18 LAB
HGB A2 MFR BLD: 2.7 % (ref 1.5–3.5)
HGB PNL BLD ELPH: 97.3 % (ref 95.5–100)

## 2024-09-20 ENCOUNTER — TELEPHONE (OUTPATIENT)
Dept: OBGYN CLINIC | Facility: CLINIC | Age: 24
End: 2024-09-20

## 2024-09-20 NOTE — TELEPHONE ENCOUNTER
RN contacted pt, left message informing her of ultrasound scheduled before next NENO visit. 10/7/24 830 ultrasound followed by visit with Dr. Camp.       ----- Message from Annabelle Camp sent at 9/20/2024  4:08 PM CDT -----  Regarding: FW: Ultrasound report ready  Incomplete views on this patient's anatomy scan. I placed an order for a repeat US in 2-3 weeks, can you please let the patient know to schedule this?  ----- Message -----  From: Victoria Brito  Sent: 9/17/2024  10:25 AM CDT  To: Annabelle Camp,   Subject: Ultrasound report ready

## 2024-09-24 ENCOUNTER — TELEPHONE (OUTPATIENT)
Dept: OBGYN CLINIC | Facility: CLINIC | Age: 24
End: 2024-09-24

## 2024-09-24 NOTE — TELEPHONE ENCOUNTER
Prequel normal and gender TBA, needs repeat ultrasound     Left message to call back  Called 2nd number no answer.

## 2024-09-26 ENCOUNTER — TELEPHONE (OUTPATIENT)
Dept: OBGYN CLINIC | Facility: CLINIC | Age: 24
End: 2024-09-26

## 2024-09-27 ENCOUNTER — TELEPHONE (OUTPATIENT)
Dept: OBGYN CLINIC | Facility: CLINIC | Age: 24
End: 2024-09-27

## 2024-10-07 ENCOUNTER — LAB ENCOUNTER (OUTPATIENT)
Dept: LAB | Facility: REFERENCE LAB | Age: 24
End: 2024-10-07
Attending: OBSTETRICS & GYNECOLOGY
Payer: COMMERCIAL

## 2024-10-07 ENCOUNTER — INITIAL PRENATAL (OUTPATIENT)
Dept: OBGYN CLINIC | Facility: CLINIC | Age: 24
End: 2024-10-07
Payer: COMMERCIAL

## 2024-10-07 VITALS
DIASTOLIC BLOOD PRESSURE: 64 MMHG | SYSTOLIC BLOOD PRESSURE: 104 MMHG | WEIGHT: 164.69 LBS | HEIGHT: 67 IN | BODY MASS INDEX: 25.85 KG/M2

## 2024-10-07 DIAGNOSIS — Z34.82 ENCOUNTER FOR SUPERVISION OF OTHER NORMAL PREGNANCY IN SECOND TRIMESTER (HCC): Primary | ICD-10-CM

## 2024-10-07 DIAGNOSIS — R73.09 ABNORMAL GLUCOSE TOLERANCE TEST: ICD-10-CM

## 2024-10-07 DIAGNOSIS — Z34.90 PREGNANCY, UNSPECIFIED GESTATIONAL AGE (HCC): ICD-10-CM

## 2024-10-07 DIAGNOSIS — Z34.92 SECOND TRIMESTER PREGNANCY (HCC): ICD-10-CM

## 2024-10-07 DIAGNOSIS — N12 PYELONEPHRITIS: ICD-10-CM

## 2024-10-07 LAB — GLUCOSE 1H P GLC SERPL-MCNC: 143 MG/DL

## 2024-10-07 PROCEDURE — 82950 GLUCOSE TEST: CPT

## 2024-10-07 PROCEDURE — 36415 COLL VENOUS BLD VENIPUNCTURE: CPT

## 2024-10-07 RX ORDER — CEPHALEXIN 500 MG/1
500 CAPSULE ORAL DAILY
Qty: 28 CAPSULE | Refills: 5 | Status: SHIPPED | OUTPATIENT
Start: 2024-10-07 | End: 2025-03-24

## 2024-10-07 NOTE — PROGRESS NOTES
RETURN OB VISIT    Lena Montenegro is a 24 year old  at 24w2d presenting for first OB visit. Today she reports no bleeding, cramping, or leaking fluid. She is starting to feel fetal movement. No UTI symptoms today.      Second trimester pregnancy (HCC)  -1hr GTT today  -follow up anatomy ultrasound ASAP for additional face and profile views     Pyelonephritis  -no UTI symptoms today  -did not  Rx for Keflex due to unclear pharmacy issue  -Rx re-sent today      Annabelle Camp DO

## 2024-10-07 NOTE — PROGRESS NOTES
Called pt informed of gtt, per pt having difficulty with schedule therefore informed can be done weekends but must call to schedule and number provided.  Pt agrees.

## 2024-10-24 ENCOUNTER — ULTRASOUND ENCOUNTER (OUTPATIENT)
Dept: OBGYN CLINIC | Facility: CLINIC | Age: 24
End: 2024-10-24
Payer: COMMERCIAL

## 2024-10-24 ENCOUNTER — LAB ENCOUNTER (OUTPATIENT)
Dept: LAB | Facility: REFERENCE LAB | Age: 24
End: 2024-10-24
Attending: OBSTETRICS & GYNECOLOGY
Payer: COMMERCIAL

## 2024-10-24 ENCOUNTER — OFFICE VISIT (OUTPATIENT)
Facility: CLINIC | Age: 24
End: 2024-10-24
Payer: COMMERCIAL

## 2024-10-24 VITALS
HEIGHT: 67 IN | BODY MASS INDEX: 25.43 KG/M2 | WEIGHT: 162 LBS | SYSTOLIC BLOOD PRESSURE: 120 MMHG | HEART RATE: 77 BPM | DIASTOLIC BLOOD PRESSURE: 60 MMHG | OXYGEN SATURATION: 99 %

## 2024-10-24 DIAGNOSIS — Z00.00 ENCOUNTER FOR GENERAL ADULT MEDICAL EXAMINATION W/O ABNORMAL FINDINGS: Primary | ICD-10-CM

## 2024-10-24 DIAGNOSIS — Z34.90 PREGNANCY, UNSPECIFIED GESTATIONAL AGE (HCC): Primary | ICD-10-CM

## 2024-10-24 DIAGNOSIS — Z34.82 ENCOUNTER FOR SUPERVISION OF OTHER NORMAL PREGNANCY IN SECOND TRIMESTER (HCC): ICD-10-CM

## 2024-10-24 DIAGNOSIS — R73.09 IMPAIRED GLUCOSE TOLERANCE TEST: ICD-10-CM

## 2024-10-24 DIAGNOSIS — Z28.20 IMMUNIZATION NOT CARRIED OUT BECAUSE OF PATIENT DECISION: ICD-10-CM

## 2024-10-24 LAB
GLUCOSE 1H P GLC SERPL-MCNC: 146 MG/DL
GLUCOSE 2H P GLC SERPL-MCNC: 125 MG/DL
GLUCOSE 3H P GLC SERPL-MCNC: 95 MG/DL (ref 70–140)
GLUCOSE P FAST SERPL-MCNC: 126 MG/DL

## 2024-10-24 PROCEDURE — 82951 GLUCOSE TOLERANCE TEST (GTT): CPT

## 2024-10-24 PROCEDURE — 99385 PREV VISIT NEW AGE 18-39: CPT | Performed by: FAMILY MEDICINE

## 2024-10-24 PROCEDURE — 36415 COLL VENOUS BLD VENIPUNCTURE: CPT

## 2024-10-24 PROCEDURE — 82952 GTT-ADDED SAMPLES: CPT

## 2024-10-24 NOTE — PROGRESS NOTES
Subjective:   Lena Montenegro is a 24 year old female who presents for [facility]     Patient presents to establish care. No concerns today.  at 26w5d. Doing well. Has history of pyelonephritis. Is taking keflex as prescribed by ob/gyn. Has another son who is 7 years old.    History/Other:    Chief Complaint Reviewed and Verified  No Further Nursing Notes to   Review  Tobacco Reviewed  Allergies Reviewed  Medications Reviewed    Problem List Reviewed  Medical History Reviewed  Surgical History   Reviewed  OB Status Reviewed  Family History Reviewed  Social History   Reviewed         Tobacco:  She has never smoked tobacco.    Current Outpatient Medications   Medication Sig Dispense Refill    cephALEXin 500 MG Oral Cap Take 1 capsule (500 mg total) by mouth daily. 28 capsule 5    Prenatal Multivit-Min-Fe-FA (PRE-JAIR OR) Take 1 tablet by mouth daily. Taking Plena Fem pre jair      fluconazole (DIFLUCAN) 150 MG Oral Tab Take 1 tablet by mouth now. May repeat in 72 hrs if symptoms persist. Please call office if not resolved after 2 doses. (Patient not taking: Reported on 10/24/2024) 2 tablet 0         Review of Systems:  Review of Systems   Constitutional: Negative.    HENT: Negative.     Eyes: Negative.    Respiratory: Negative.     Cardiovascular: Negative.    Gastrointestinal: Negative.    Genitourinary: Negative.    Musculoskeletal: Negative.    Skin: Negative.    Neurological: Negative.    Psychiatric/Behavioral: Negative.           Objective:   /60   Pulse 77   Ht 5' 7\" (1.702 m)   Wt 162 lb (73.5 kg)   LMP 2024 (Exact Date)   SpO2 99%   BMI 25.37 kg/m²  Estimated body mass index is 25.37 kg/m² as calculated from the following:    Height as of this encounter: 5' 7\" (1.702 m).    Weight as of this encounter: 162 lb (73.5 kg).  Physical Exam  Vitals and nursing note reviewed.   Constitutional:       General: She is not in acute distress.     Appearance: Normal appearance.  She is not ill-appearing.   HENT:      Head: Normocephalic and atraumatic.      Right Ear: Tympanic membrane normal. There is no impacted cerumen.      Left Ear: Tympanic membrane normal. There is no impacted cerumen.      Mouth/Throat:      Mouth: Mucous membranes are moist.      Pharynx: Oropharynx is clear. No oropharyngeal exudate or posterior oropharyngeal erythema.   Eyes:      General:         Right eye: No discharge.         Left eye: No discharge.      Extraocular Movements: Extraocular movements intact.      Pupils: Pupils are equal, round, and reactive to light.   Cardiovascular:      Rate and Rhythm: Normal rate and regular rhythm.      Heart sounds: Normal heart sounds. No murmur heard.     No friction rub. No gallop.   Pulmonary:      Effort: Pulmonary effort is normal.      Breath sounds: Normal breath sounds. No wheezing, rhonchi or rales.   Abdominal:      General: Abdomen is flat. Bowel sounds are normal. There is no distension.      Palpations: Abdomen is soft. There is no mass.      Tenderness: There is no abdominal tenderness. There is no guarding or rebound.   Musculoskeletal:         General: Normal range of motion.      Cervical back: Normal range of motion.      Right lower leg: No edema.      Left lower leg: No edema.   Skin:     General: Skin is warm and dry.      Findings: No rash.   Neurological:      General: No focal deficit present.      Mental Status: She is alert. Mental status is at baseline.   Psychiatric:         Mood and Affect: Mood normal.         Behavior: Behavior normal.         Assessment & Plan:   1. Encounter for general adult medical examination w/o abnormal findings (Primary)  -doing well. No concerns      2. Immunization not carried out because of patient decision  -declined flu shot      Return in about 1 year (around 10/24/2025), or if symptoms worsen or fail to improve.    Mar Jaquez MD, 10/24/2024, 10:37 AM

## 2024-12-17 ENCOUNTER — ROUTINE PRENATAL (OUTPATIENT)
Dept: OBGYN CLINIC | Facility: CLINIC | Age: 24
End: 2024-12-17
Payer: COMMERCIAL

## 2024-12-17 VITALS
SYSTOLIC BLOOD PRESSURE: 118 MMHG | BODY MASS INDEX: 26.84 KG/M2 | DIASTOLIC BLOOD PRESSURE: 68 MMHG | WEIGHT: 171 LBS | HEIGHT: 67 IN

## 2024-12-17 DIAGNOSIS — Z34.93 THIRD TRIMESTER PREGNANCY (HCC): Primary | ICD-10-CM

## 2024-12-17 PROBLEM — R73.09 ABNORMAL GLUCOSE TOLERANCE TEST: Status: RESOLVED | Noted: 2024-10-07 | Resolved: 2024-12-17

## 2024-12-17 PROCEDURE — 90472 IMMUNIZATION ADMIN EACH ADD: CPT | Performed by: OBSTETRICS & GYNECOLOGY

## 2024-12-17 PROCEDURE — 90678 RSV VACC PREF BIVALENT IM: CPT | Performed by: OBSTETRICS & GYNECOLOGY

## 2024-12-17 PROCEDURE — 90715 TDAP VACCINE 7 YRS/> IM: CPT | Performed by: OBSTETRICS & GYNECOLOGY

## 2024-12-17 PROCEDURE — 90471 IMMUNIZATION ADMIN: CPT | Performed by: OBSTETRICS & GYNECOLOGY

## 2024-12-17 NOTE — PROGRESS NOTES
25 y/o  at 34 3/7 W  Hx of pyelonephritis in pregnancy. On prophylactic Keflex. She has not been taking ABX. I encouraged her to do this  Flu, RSV and TDAP vaccines not received. I dw her and her partner benefit. They agree to RSV and TDAP vaccine today.